# Patient Record
Sex: FEMALE | Race: WHITE | NOT HISPANIC OR LATINO | Employment: UNEMPLOYED | URBAN - METROPOLITAN AREA
[De-identification: names, ages, dates, MRNs, and addresses within clinical notes are randomized per-mention and may not be internally consistent; named-entity substitution may affect disease eponyms.]

---

## 2018-08-08 ENCOUNTER — OFFICE VISIT (OUTPATIENT)
Dept: MEDICAL GROUP | Facility: MEDICAL CENTER | Age: 58
End: 2018-08-08
Attending: NURSE PRACTITIONER
Payer: MEDICAID

## 2018-08-08 VITALS
WEIGHT: 241 LBS | RESPIRATION RATE: 22 BRPM | OXYGEN SATURATION: 92 % | DIASTOLIC BLOOD PRESSURE: 70 MMHG | HEIGHT: 65 IN | TEMPERATURE: 99.2 F | BODY MASS INDEX: 40.15 KG/M2 | HEART RATE: 100 BPM | SYSTOLIC BLOOD PRESSURE: 138 MMHG

## 2018-08-08 DIAGNOSIS — J44.9 CHRONIC OBSTRUCTIVE PULMONARY DISEASE, UNSPECIFIED COPD TYPE (HCC): ICD-10-CM

## 2018-08-08 DIAGNOSIS — Z72.0 TOBACCO ABUSE: ICD-10-CM

## 2018-08-08 DIAGNOSIS — Z13.29 SCREENING FOR THYROID DISORDER: ICD-10-CM

## 2018-08-08 DIAGNOSIS — Z13.1 SCREENING FOR DIABETES MELLITUS: ICD-10-CM

## 2018-08-08 DIAGNOSIS — N18.30 STAGE 3 CHRONIC KIDNEY DISEASE (HCC): ICD-10-CM

## 2018-08-08 DIAGNOSIS — Z13.0 SCREENING FOR IRON DEFICIENCY ANEMIA: ICD-10-CM

## 2018-08-08 DIAGNOSIS — M79.7 FIBROMYALGIA: ICD-10-CM

## 2018-08-08 DIAGNOSIS — Z13.21 ENCOUNTER FOR VITAMIN DEFICIENCY SCREENING: ICD-10-CM

## 2018-08-08 DIAGNOSIS — Z85.42 HISTORY OF UTERINE CANCER: ICD-10-CM

## 2018-08-08 DIAGNOSIS — I25.10 CAD, MULTIPLE VESSEL: ICD-10-CM

## 2018-08-08 DIAGNOSIS — Z12.11 SCREEN FOR COLON CANCER: ICD-10-CM

## 2018-08-08 DIAGNOSIS — Z00.00 ROUTINE HEALTH MAINTENANCE: ICD-10-CM

## 2018-08-08 DIAGNOSIS — G47.39 OTHER SLEEP APNEA: ICD-10-CM

## 2018-08-08 DIAGNOSIS — I10 ESSENTIAL HYPERTENSION: ICD-10-CM

## 2018-08-08 DIAGNOSIS — G89.4 CHRONIC PAIN SYNDROME: ICD-10-CM

## 2018-08-08 DIAGNOSIS — K21.00 GASTROESOPHAGEAL REFLUX DISEASE WITH ESOPHAGITIS: ICD-10-CM

## 2018-08-08 DIAGNOSIS — Z12.39 SCREENING FOR BREAST CANCER: ICD-10-CM

## 2018-08-08 DIAGNOSIS — D12.6 ADENOMATOUS POLYP OF COLON, UNSPECIFIED PART OF COLON: ICD-10-CM

## 2018-08-08 DIAGNOSIS — Z13.220 SCREENING FOR CHOLESTEROL LEVEL: ICD-10-CM

## 2018-08-08 DIAGNOSIS — G40.909 SEIZURE DISORDER (HCC): ICD-10-CM

## 2018-08-08 DIAGNOSIS — E78.2 MIXED HYPERLIPIDEMIA: ICD-10-CM

## 2018-08-08 DIAGNOSIS — E66.01 CLASS 3 SEVERE OBESITY DUE TO EXCESS CALORIES WITH SERIOUS COMORBIDITY AND BODY MASS INDEX (BMI) OF 40.0 TO 44.9 IN ADULT (HCC): ICD-10-CM

## 2018-08-08 DIAGNOSIS — R23.2 HOT FLASHES: ICD-10-CM

## 2018-08-08 DIAGNOSIS — K59.09 OTHER CONSTIPATION: ICD-10-CM

## 2018-08-08 DIAGNOSIS — F99 PSYCHIATRIC DISORDER: ICD-10-CM

## 2018-08-08 PROBLEM — K63.5 COLON POLYPS: Status: ACTIVE | Noted: 2018-08-08

## 2018-08-08 PROCEDURE — 99204 OFFICE O/P NEW MOD 45 MIN: CPT | Performed by: NURSE PRACTITIONER

## 2018-08-08 RX ORDER — METHOCARBAMOL 500 MG/1
1000 TABLET, FILM COATED ORAL 3 TIMES DAILY
COMMUNITY
End: 2018-08-08 | Stop reason: SDUPTHER

## 2018-08-08 RX ORDER — BUSPIRONE HYDROCHLORIDE 15 MG/1
7.5 TABLET ORAL 3 TIMES DAILY
COMMUNITY
End: 2018-08-08 | Stop reason: SDUPTHER

## 2018-08-08 RX ORDER — SYRINGE-NEEDLE,INSULIN,0.5 ML 28GX1/2"
SYRINGE, EMPTY DISPOSABLE MISCELLANEOUS
Qty: 200 EACH | Refills: 1 | Status: SHIPPED | OUTPATIENT
Start: 2018-08-08 | End: 2019-01-07

## 2018-08-08 RX ORDER — ASPIRIN AND DIPYRIDAMOLE 25; 200 MG/1; MG/1
1 CAPSULE, EXTENDED RELEASE ORAL 2 TIMES DAILY
COMMUNITY
End: 2018-08-08 | Stop reason: SDUPTHER

## 2018-08-08 RX ORDER — GEMFIBROZIL 600 MG/1
600 TABLET, FILM COATED ORAL 2 TIMES DAILY
Qty: 60 TAB | Refills: 1 | Status: SHIPPED | OUTPATIENT
Start: 2018-08-08 | End: 2018-10-01 | Stop reason: SDUPTHER

## 2018-08-08 RX ORDER — INSULIN GLARGINE 100 [IU]/ML
80 INJECTION, SOLUTION SUBCUTANEOUS NIGHTLY
COMMUNITY
End: 2018-08-08 | Stop reason: SDUPTHER

## 2018-08-08 RX ORDER — ALBUTEROL SULFATE 90 UG/1
2 AEROSOL, METERED RESPIRATORY (INHALATION) EVERY 6 HOURS PRN
Qty: 8.5 G | Refills: 2 | Status: SHIPPED | OUTPATIENT
Start: 2018-08-08 | End: 2019-01-07

## 2018-08-08 RX ORDER — ATORVASTATIN CALCIUM 40 MG/1
40 TABLET, FILM COATED ORAL DAILY
Qty: 30 TAB | Refills: 1 | Status: SHIPPED | OUTPATIENT
Start: 2018-08-08 | End: 2019-01-07

## 2018-08-08 RX ORDER — TOPIRAMATE 50 MG/1
50 TABLET, FILM COATED ORAL 2 TIMES DAILY
Qty: 60 TAB | Refills: 1 | Status: SHIPPED | OUTPATIENT
Start: 2018-08-08 | End: 2018-10-01 | Stop reason: SDUPTHER

## 2018-08-08 RX ORDER — QUETIAPINE FUMARATE 400 MG/1
800 TABLET, FILM COATED ORAL
COMMUNITY
End: 2018-08-08 | Stop reason: SDUPTHER

## 2018-08-08 RX ORDER — PREGABALIN 200 MG/1
200 CAPSULE ORAL 2 TIMES DAILY
Qty: 60 CAP | Refills: 1 | Status: SHIPPED | OUTPATIENT
Start: 2018-08-08 | End: 2018-09-07

## 2018-08-08 RX ORDER — METHOCARBAMOL 500 MG/1
1000 TABLET, FILM COATED ORAL 3 TIMES DAILY
Qty: 180 TAB | Refills: 1 | Status: SHIPPED | OUTPATIENT
Start: 2018-08-08 | End: 2018-10-10 | Stop reason: RX

## 2018-08-08 RX ORDER — LEVETIRACETAM 1000 MG/1
1500 TABLET ORAL 2 TIMES DAILY
COMMUNITY
End: 2018-08-08 | Stop reason: SDUPTHER

## 2018-08-08 RX ORDER — POTASSIUM CHLORIDE 750 MG/1
10 TABLET, EXTENDED RELEASE ORAL 2 TIMES DAILY
COMMUNITY
End: 2018-08-08 | Stop reason: SDUPTHER

## 2018-08-08 RX ORDER — NITROGLYCERIN 40 MG/1
1 PATCH TRANSDERMAL DAILY
COMMUNITY
End: 2019-01-07

## 2018-08-08 RX ORDER — TOPIRAMATE 50 MG/1
50 TABLET, FILM COATED ORAL 2 TIMES DAILY
COMMUNITY
End: 2018-08-08 | Stop reason: SDUPTHER

## 2018-08-08 RX ORDER — POTASSIUM CHLORIDE 750 MG/1
10 TABLET, EXTENDED RELEASE ORAL 2 TIMES DAILY
Qty: 60 TAB | Refills: 1 | Status: SHIPPED | OUTPATIENT
Start: 2018-08-08 | End: 2018-10-01 | Stop reason: SDUPTHER

## 2018-08-08 RX ORDER — MIRTAZAPINE 45 MG/1
45 TABLET, FILM COATED ORAL
Qty: 30 TAB | Refills: 1 | Status: SHIPPED | OUTPATIENT
Start: 2018-08-08 | End: 2018-10-01 | Stop reason: SDUPTHER

## 2018-08-08 RX ORDER — FUROSEMIDE 40 MG/1
40 TABLET ORAL DAILY
COMMUNITY
End: 2018-08-08 | Stop reason: SDUPTHER

## 2018-08-08 RX ORDER — POLYETHYLENE GLYCOL 3350 17 G/17G
17 POWDER, FOR SOLUTION ORAL DAILY
Qty: 1 EACH | Refills: 1 | Status: SHIPPED | OUTPATIENT
Start: 2018-08-08 | End: 2019-01-07

## 2018-08-08 RX ORDER — RANITIDINE 300 MG/1
300 TABLET ORAL 2 TIMES DAILY
Qty: 60 TAB | Refills: 1 | Status: SHIPPED | OUTPATIENT
Start: 2018-08-08 | End: 2018-10-01 | Stop reason: SDUPTHER

## 2018-08-08 RX ORDER — DEXLANSOPRAZOLE 60 MG/1
60 CAPSULE, DELAYED RELEASE ORAL DAILY
COMMUNITY
End: 2018-08-08 | Stop reason: SDUPTHER

## 2018-08-08 RX ORDER — POLYETHYLENE GLYCOL 3350 17 G/17G
17 POWDER, FOR SOLUTION ORAL DAILY
COMMUNITY
End: 2018-08-08 | Stop reason: SDUPTHER

## 2018-08-08 RX ORDER — SYRINGE-NEEDLE,INSULIN,0.5 ML 28GX1/2"
SYRINGE, EMPTY DISPOSABLE MISCELLANEOUS
COMMUNITY
End: 2018-08-08 | Stop reason: SDUPTHER

## 2018-08-08 RX ORDER — FUROSEMIDE 40 MG/1
40 TABLET ORAL DAILY
Qty: 30 TAB | Refills: 1 | Status: SHIPPED | OUTPATIENT
Start: 2018-08-08 | End: 2018-09-17 | Stop reason: SDUPTHER

## 2018-08-08 RX ORDER — ASPIRIN AND DIPYRIDAMOLE 25; 200 MG/1; MG/1
1 CAPSULE, EXTENDED RELEASE ORAL 2 TIMES DAILY
Qty: 60 CAP | Refills: 1 | Status: SHIPPED | OUTPATIENT
Start: 2018-08-08 | End: 2018-10-09 | Stop reason: SDUPTHER

## 2018-08-08 RX ORDER — ATORVASTATIN CALCIUM 40 MG/1
40 TABLET, FILM COATED ORAL NIGHTLY
COMMUNITY
End: 2018-08-08 | Stop reason: SDUPTHER

## 2018-08-08 RX ORDER — DEXLANSOPRAZOLE 60 MG/1
60 CAPSULE, DELAYED RELEASE ORAL DAILY
Qty: 30 CAP | Refills: 2 | Status: SHIPPED | OUTPATIENT
Start: 2018-08-08 | End: 2019-01-07

## 2018-08-08 RX ORDER — RANITIDINE 300 MG/1
300 TABLET ORAL 2 TIMES DAILY
COMMUNITY
End: 2018-08-08 | Stop reason: SDUPTHER

## 2018-08-08 RX ORDER — LEVETIRACETAM 1000 MG/1
1500 TABLET ORAL 2 TIMES DAILY
Qty: 90 TAB | Refills: 1 | Status: SHIPPED | OUTPATIENT
Start: 2018-08-08 | End: 2018-10-01 | Stop reason: SDUPTHER

## 2018-08-08 RX ORDER — GEMFIBROZIL 600 MG/1
600 TABLET, FILM COATED ORAL 2 TIMES DAILY
COMMUNITY
End: 2018-08-08 | Stop reason: SDUPTHER

## 2018-08-08 RX ORDER — MIRTAZAPINE 45 MG/1
45 TABLET, FILM COATED ORAL NIGHTLY
COMMUNITY
End: 2018-08-08 | Stop reason: SDUPTHER

## 2018-08-08 RX ORDER — QUETIAPINE FUMARATE 400 MG/1
800 TABLET, FILM COATED ORAL
Qty: 60 TAB | Refills: 1 | Status: SHIPPED | OUTPATIENT
Start: 2018-08-08 | End: 2019-04-01 | Stop reason: SDUPTHER

## 2018-08-08 RX ORDER — ALBUTEROL SULFATE 90 UG/1
2 AEROSOL, METERED RESPIRATORY (INHALATION) EVERY 6 HOURS PRN
COMMUNITY
End: 2018-08-08 | Stop reason: SDUPTHER

## 2018-08-08 RX ORDER — BUSPIRONE HYDROCHLORIDE 15 MG/1
7.5 TABLET ORAL 3 TIMES DAILY
Qty: 90 TAB | Refills: 1 | Status: SHIPPED | OUTPATIENT
Start: 2018-08-08 | End: 2018-09-11 | Stop reason: SDUPTHER

## 2018-08-08 RX ORDER — PREGABALIN 200 MG/1
200 CAPSULE ORAL 2 TIMES DAILY
COMMUNITY
End: 2018-08-08 | Stop reason: SDUPTHER

## 2018-08-08 RX ORDER — INSULIN GLARGINE 100 [IU]/ML
80 INJECTION, SOLUTION SUBCUTANEOUS NIGHTLY PRN
Qty: 20 ML | Refills: 1 | Status: SHIPPED | OUTPATIENT
Start: 2018-08-08 | End: 2018-09-12 | Stop reason: SDUPTHER

## 2018-08-08 ASSESSMENT — PATIENT HEALTH QUESTIONNAIRE - PHQ9
CLINICAL INTERPRETATION OF PHQ2 SCORE: 6
SUM OF ALL RESPONSES TO PHQ QUESTIONS 1-9: 22
5. POOR APPETITE OR OVEREATING: 3 - NEARLY EVERY DAY

## 2018-08-08 NOTE — ASSESSMENT & PLAN NOTE
"Reports chronic pain, \"all over\"  And hx of \"Fibromyalgia\"  Taking Lyrica and needs refill  Also asking for narcotic but denied by me  Asking for Pain Management Referral.    "

## 2018-08-08 NOTE — ASSESSMENT & PLAN NOTE
"Has bad hot flashes 'bad\" and was on estrogen pills in past and because of age and smoking she was stopped.   Will refer to GYN.  "

## 2018-08-08 NOTE — PROGRESS NOTES
"Chief Complaint:   Chief Complaint   Patient presents with   • New Patient   • Colon Polyps   • Hiatal Hernia   • Diabetes       HPI:  Norma is here today to establish as a new patient with multiple concerns  And multiple medication refills and referral requests.  .    Nev  Report:   No entries    Her PMH includes:  Anxiety and Depression  Bipolar Disorder  Fibromyalgia  Seizures  HTN  MAGNOLIA  CAD  DM  Morbid Obesity  \"fibromyalgia\"  Chronic pain  Colon Polyps  Hiatal Hernia  Short term memory concerns  Kidney Disease  Tobacco abuse- smoking  Constipation  GERD  Cervical/Uterine Cancer w hysterectomy         Colon polyps  Hx of Colon Polyps found 2-3 yrs ago.  Polypectomy every year the past few years  Needs fit test and referral to GI    Denies bloody stools.  Has constipation w hard stools  Uses laxative.    Mixed hyperlipidemia  Hx of hi triglycerides and cholesterol   On meds from [place]  Will check lipid panel    Psychiatric disorder  Hx of Anxiety, Depression, Bipolar Disorder  On meds  REcent move from [place]  Will refer to Psychiatry and Psychology    Uncontrolled type 2 diabetes mellitus without complication, without long-term current use of insulin (HCC)  DM-2 on Insulin, obese  > 15 yrs DM-2  5 yrs on Insulin  Last A1c= 9.2 about 3-4 months ago.     Routine health maintenance  LMP 20 yrs.  Hysterectomy 2' \"maybe Cervical cancer.  Oophrectomy.  appendectomy    Hot flashes/ Hx of Uterine Ca and Hysterectomy  Has bad hot flashes 'bad\" and was on estrogen pills in past and because of age and smoking she was stopped.   Hx of Cervical or Uterine ca and surgery 20 yrs ago. Has not had f/u w GYN  Will refer to GYN.    Other sleep apnea/COPD  Sleep apnea on CPAP 10-15 yrs.  \"old machine\"   Needs new study  Hx of heavy smoking for years  Reports has COPD and needs med refills    Stage 3 chronic kidney disease  REports hx of CKD-3  Previous w Nephrologist in [place]  Needs CMP and referral.    CAD, multiple " "vessel  Hx of CAD, hyperlipidemia  Has had PCTA.  Needs cardiology eval and Med Refills.  Denies current CP or SOB    Chronic Pain/ \"fibromyalgia\"  Reports chronic pain, \"all over\"  And hx of \"Fibromyalgia\"  Taking Lyrica and needs refill  Also asking for narcotic but denied by me  Asking for Pain Management Referral.    Patient Active Problem List    Diagnosis Date Noted   • Routine health maintenance 08/08/2018   • Uncontrolled type 2 diabetes mellitus without complication, without long-term current use of insulin (HCC) 08/08/2018   • Colon polyps 08/08/2018   • Mixed hyperlipidemia 08/08/2018   • Tobacco abuse 08/08/2018   • Psychiatric disorder 08/08/2018   • Hot flashes 08/08/2018   • Other sleep apnea 08/08/2018   • Stage 3 chronic kidney disease 08/08/2018   • CAD, multiple vessel 08/08/2018   • Chronic pain syndrome 08/08/2018       Allergies:Patient has no allergy information on record.    Current Outpatient Prescriptions   Medication Sig Dispense Refill   • Cranberry 1000 MG Cap Take 1,000 mg by mouth every day.     • nitroglycerin (NITRODUR) 0.2 MG/HR PATCH 24 HR Apply 1 Patch to skin as directed every day.     • Dexlansoprazole (DEXILANT) 60 MG CAPSULE DELAYED RELEASE delayed-release capsule Take 60 mg by mouth every day. 30 Cap 2   • Plecanatide (TRULANCE) 3 MG Tab Take 3 mg by mouth every day. 30 Tab 1   • aspirin-dipyridamole (AGGRENOX)  MG CAPSULE SR 12 HR Take 1 Cap by mouth 2 times a day. 60 Cap 1   • topiramate (TOPAMAX) 50 MG tablet Take 1 Tab by mouth 2 times a day. 60 Tab 1   • metoprolol (LOPRESSOR) 25 MG Tab Take 1 Tab by mouth every day. 30 Tab 2   • polyethylene glycol/lytes (MIRALAX) Pack Take 1 Packet by mouth every day. 1 Each 1   • methocarbamol (ROBAXIN) 500 MG Tab Take 2 Tabs by mouth 3 times a day. 180 Tab 1   • pregabalin (LYRICA) 200 MG capsule Take 1 Cap by mouth 2 times a day for 30 days. 60 Cap 1   • mirtazapine (REMERON) 45 MG tablet Take 1 Tab by mouth 1 time daily as " "needed. 30 Tab 1   • Levomilnacipran HCl ER (FETZIMA) 120 MG CAPSULE SR 24 HR Take 120 mg by mouth every morning. 30 Cap 2   • quetiapine (SEROQUEL) 400 MG tablet Take 2 Tabs by mouth every bedtime. 60 Tab 1   • busPIRone (BUSPAR) 15 MG tablet Take 0.5 Tabs by mouth 3 times a day. 90 Tab 1   • gemfibrozil (LOPID) 600 MG Tab Take 1 Tab by mouth 2 times a day. 60 Tab 1   • atorvastatin (LIPITOR) 40 MG Tab Take 1 Tab by mouth every day. 30 Tab 1   • furosemide (LASIX) 40 MG Tab Take 1 Tab by mouth every day. 30 Tab 1   • metFORMIN (GLUCOPHAGE) 500 MG Tab Take 1 Tab by mouth 2 times a day, with meals. 60 Tab 1   • levetiracetam (KEPPRA) 1000 MG tablet Take 1.5 Tabs by mouth 2 Times a Day. 90 Tab 1   • ranitidine (ZANTAC) 300 MG tablet Take 1 Tab by mouth 2 Times a Day. 60 Tab 1   • insulin lispro (HUMALOG) 100 UNIT/ML Solution Inject 40 Units as instructed 3 times a day before meals. 20 mL 1   • insulin glargine (LANTUS) 100 UNIT/ML Solution Inject 80 Units as instructed at bedtime as needed. 20 mL 1   • Insulin Syringe-Needle U-100 (INSULIN SYRINGE .5CC/30GX1/2\") 30G X 1/2\" 0.5 ML Misc Two types of insulin, inject 4x day 200 Each 1   • albuterol (PROVENTIL HFA) 108 (90 Base) MCG/ACT Aero Soln inhalation aerosol Inhale 2 Puffs by mouth every 6 hours as needed for Shortness of Breath. 8.5 g 2   • potassium chloride SA (K-DUR) 10 MEQ Tab CR Take 1 Tab by mouth 2 times a day. 60 Tab 1     No current facility-administered medications for this visit.        Social History   Substance Use Topics   • Smoking status: Current Every Day Smoker     Packs/day: 2.00     Years: 37.00     Types: Cigarettes   • Smokeless tobacco: Never Used   • Alcohol use No       Past Medical History:   Diagnosis Date   • Anxiety    • Bipolar affective disorder (HCC)    • Depression    • Diabetes (HCC)    • Kidney disease        Family History   Problem Relation Age of Onset   • Heart Disease Mother    • Diabetes Father    • Cancer Maternal " "Grandfather        ROS:  Review of Systems   See HPI Above    Exam:  Blood pressure 138/70, pulse 100, temperature 37.3 °C (99.2 °F), resp. rate (!) 22, height 1.651 m (5' 5\"), weight 109.3 kg (241 lb), SpO2 92 %. Body mass index is 40.1 kg/m².    General:  Well nourished, obese,well developed female in moderate discomfort  HENT:Head is grossly normal. PERRL.  Neck: Supple. Trachea is midline.  Pulmonary: Clear but diminished throughout to ausculation .  Normal effort. No rales, ronchi, or wheezing.   Cardiovascular: Regular rate and rhythm.  Abdomen-Abdomen is soft, No tenderness.  Upper extremities- Strong = . Good ROM  Lower extremities- neg for edema, redness, tenderness.  Neuro- A & O x 4. Speech clear and appropriate.    Current medications, allergies, and problem list reviewed with patient and updated in [facility] today.    Assessment/Plan:  1. Screening for iron deficiency anemia  CBC WITH DIFFERENTIAL    IRON/TOTAL IRON BIND   2. Screening for thyroid disorder  TSH   3. Screening for diabetes mellitus  HEMOGLOBIN A1C    COMP METABOLIC PANEL   4. Encounter for vitamin deficiency screening  VITAMIN B12    VITAMIN D,25 HYDROXY   5. Screening for cholesterol level  LIPID PROFILE   6. Screen for colon cancer  OCCULT BLOOD FECES IMMUNOASSAY    OCCULT BLOOD FECES IMMUNOASSAY (FIT)   7. Routine health maintenance  Refer GYN  , will need REtina exam this year.   8. Screening for breast cancer  MA-SCREEN MAMMO W/CAD-BILAT   9. Uncontrolled type 2 diabetes mellitus without complication, without long-term current use of insulin (HCC)  metFORMIN (GLUCOPHAGE) 500 MG Tab    insulin lispro (HUMALOG) 100 UNIT/ML Solution    insulin glargine (LANTUS) 100 UNIT/ML Solution    Insulin Syringe-Needle U-100 (INSULIN SYRINGE .5CC/30GX1/2\") 30G X 1/2\" 0.5 ML Misc   10. Adenomatous polyp of colon, unspecified part of colon  REFERRAL TO GI FOR COLONOSCOPY   11. Class 3 severe obesity due to excess calories with serious comorbidity " and body mass index (BMI) of 40.0 to 44.9 in adult ([facility])  Patient identified as having weight management issue.  Appropriate orders and counseling given.   12. Mixed hyperlipidemia  gemfibrozil (LOPID) 600 MG Tab    atorvastatin (LIPITOR) 40 MG Tab   13. Tobacco abuse  Counseled but not ready to quit   14. Psychiatric disorder  REFERRAL TO PSYCHIATRY    REFERRAL TO PSYCHOLOGY    mirtazapine (REMERON) 45 MG tablet    Levomilnacipran HCl ER (FETZIMA) 120 MG CAPSULE SR 24 HR    quetiapine (SEROQUEL) 400 MG tablet    busPIRone (BUSPAR) 15 MG tablet   15. History of uterine cancer  REFERRAL TO GYNECOLOGY   16. Hot flashes  GYN   17. Other sleep apnea  REFERRAL TO SLEEP STUDIES  Continue current CPAP   18. Stage 3 chronic kidney disease  REFERRAL TO NEPHROLOGY   19. CAD, multiple vessel  REFERRAL TO CARDIOLOGY    aspirin-dipyridamole (AGGRENOX)  MG CAPSULE SR 12 HR   20. Fibromyalgia / Chronic pain syndrome  topiramate (TOPAMAX) 50 MG tablet    methocarbamol (ROBAXIN) 500 MG Tab    pregabalin (LYRICA) 200 MG capsule  REFERRAL TO PAIN CLINIC   21. Gastroesophageal reflux disease with esophagitis  Dexlansoprazole (DEXILANT) 60 MG CAPSULE DELAYED RELEASE delayed-release capsule    ranitidine (ZANTAC) 300 MG tablet   22. Chronic obstructive pulmonary disease, unspecified COPD type ([facility])  albuterol (PROVENTIL HFA) 108 (90 Base) MCG/ACT Aero Soln inhalation aerosol   23. Other constipation  Plecanatide (TRULANCE) 3 MG Tab    polyethylene glycol/lytes (MIRALAX) Pack   24. Essential hypertension  metoprolol (LOPRESSOR) 25 MG Tab    furosemide (LASIX) 40 MG Tab    potassium chloride SA (K-DUR) 10 MEQ Tab CR   25. Seizure disorder ([facility])  levetiracetam (KEPPRA) 1000 MG tablet            Return in about 4 weeks (around 9/5/2018) for lab results.

## 2018-08-08 NOTE — ASSESSMENT & PLAN NOTE
Hx of Colon Polyps found 2-3 yrs ago.  Polypectomy every year the past few years  Needs fit test and referral to GI    Denies bloody stools.  Has constipation w hard stools  Uses laxative.

## 2018-08-08 NOTE — ASSESSMENT & PLAN NOTE
Hx of Anxiety, Depression, Bipolar Disorder  On meds  REcent move from [place]  Will refer to Psychiatry and Psychology

## 2018-08-10 ENCOUNTER — HOSPITAL ENCOUNTER (OUTPATIENT)
Facility: MEDICAL CENTER | Age: 58
End: 2018-08-10
Attending: NURSE PRACTITIONER
Payer: MEDICAID

## 2018-08-10 PROCEDURE — 82274 ASSAY TEST FOR BLOOD FECAL: CPT

## 2018-08-15 DIAGNOSIS — Z12.11 SCREEN FOR COLON CANCER: ICD-10-CM

## 2018-08-16 LAB — HEMOCCULT STL QL IA: NEGATIVE

## 2018-08-21 ENCOUNTER — TELEPHONE (OUTPATIENT)
Dept: MEDICAL GROUP | Facility: MEDICAL CENTER | Age: 58
End: 2018-08-21

## 2018-08-21 DIAGNOSIS — F99 PSYCHIATRIC DISORDER: ICD-10-CM

## 2018-08-21 DIAGNOSIS — G89.4 CHRONIC PAIN SYNDROME: ICD-10-CM

## 2018-08-21 RX ORDER — VENLAFAXINE HYDROCHLORIDE 75 MG/1
75 CAPSULE, EXTENDED RELEASE ORAL DAILY
Qty: 30 CAP | Refills: 1 | Status: SHIPPED | OUTPATIENT
Start: 2018-08-21 | End: 2018-10-13 | Stop reason: SDUPTHER

## 2018-08-21 NOTE — TELEPHONE ENCOUNTER
Called pt to discuss below information. Pt states she saw a therapist but is still waiting to be seen by a psychiatrist. She states that is fine she will try the venlafaxine while she is waiting.    Pt states she was not able to  her lyrica that day either because it required prior authorization. Advised I will call pharmacy to see which medications were picked up and which ones were not and if a prior auth is needed I will start one today.  Pt states she did not receive her insulin needles either.    I called pharmacy, they gave me a break down of all meds picked up. I was advised Lyrica was never called in nor dropped off. Advised I show it was printed on paper rx and will call it in. Was advised needles were out of stock and they will fill them today along with lyrica and the new medication when e-scripted over.

## 2018-08-21 NOTE — TELEPHONE ENCOUNTER
Please call Norma and let her know her Insurance denied the Fetzima ( SNRI Antidepressant)  And we can send in an alternative SNRI ( Venlafaxine) while she waits to establish w Psychiatry  As she should not stop an SNRI medication abruptly.  Please let me know if she has seen [facility] yet or needs me to send in RX for   Venlafaxine.  Thanks  Casey

## 2018-08-28 ENCOUNTER — HOSPITAL ENCOUNTER (OUTPATIENT)
Dept: RADIOLOGY | Facility: MEDICAL CENTER | Age: 58
End: 2018-08-28
Attending: NURSE PRACTITIONER
Payer: MEDICAID

## 2018-08-28 DIAGNOSIS — Z12.31 ENCOUNTER FOR SCREENING MAMMOGRAM FOR MALIGNANT NEOPLASM OF BREAST: ICD-10-CM

## 2018-08-28 PROCEDURE — 77067 SCR MAMMO BI INCL CAD: CPT

## 2018-09-04 ENCOUNTER — HOSPITAL ENCOUNTER (OUTPATIENT)
Dept: RADIOLOGY | Facility: MEDICAL CENTER | Age: 58
End: 2018-09-04

## 2018-09-11 ENCOUNTER — OFFICE VISIT (OUTPATIENT)
Dept: MEDICAL GROUP | Facility: MEDICAL CENTER | Age: 58
End: 2018-09-11
Attending: NURSE PRACTITIONER
Payer: MEDICAID

## 2018-09-11 VITALS
TEMPERATURE: 98.2 F | RESPIRATION RATE: 20 BRPM | WEIGHT: 234 LBS | HEART RATE: 92 BPM | HEIGHT: 65 IN | DIASTOLIC BLOOD PRESSURE: 60 MMHG | SYSTOLIC BLOOD PRESSURE: 104 MMHG | BODY MASS INDEX: 38.99 KG/M2 | OXYGEN SATURATION: 94 %

## 2018-09-11 DIAGNOSIS — H54.7 POOR VISION: ICD-10-CM

## 2018-09-11 DIAGNOSIS — M79.672 PAIN IN BOTH FEET: ICD-10-CM

## 2018-09-11 DIAGNOSIS — L08.9 SKIN INFECTION: ICD-10-CM

## 2018-09-11 DIAGNOSIS — F99 PSYCHIATRIC DISORDER: ICD-10-CM

## 2018-09-11 DIAGNOSIS — L08.9 STAPH SKIN INFECTION: ICD-10-CM

## 2018-09-11 DIAGNOSIS — E78.2 MIXED HYPERLIPIDEMIA: ICD-10-CM

## 2018-09-11 DIAGNOSIS — G89.4 CHRONIC PAIN SYNDROME: ICD-10-CM

## 2018-09-11 DIAGNOSIS — B95.8 STAPH SKIN INFECTION: ICD-10-CM

## 2018-09-11 DIAGNOSIS — N18.30 STAGE 3 CHRONIC KIDNEY DISEASE (HCC): ICD-10-CM

## 2018-09-11 DIAGNOSIS — M79.671 PAIN IN BOTH FEET: ICD-10-CM

## 2018-09-11 PROCEDURE — 99214 OFFICE O/P EST MOD 30 MIN: CPT | Performed by: NURSE PRACTITIONER

## 2018-09-11 RX ORDER — HYDROXYZINE HYDROCHLORIDE 10 MG/1
10 TABLET, FILM COATED ORAL 2 TIMES DAILY PRN
Qty: 60 TAB | Refills: 0 | Status: SHIPPED | OUTPATIENT
Start: 2018-09-11 | End: 2018-10-10 | Stop reason: SDUPTHER

## 2018-09-11 RX ORDER — DOXYCYCLINE 100 MG/1
100 CAPSULE ORAL 2 TIMES DAILY
Qty: 14 CAP | Refills: 0 | Status: SHIPPED | OUTPATIENT
Start: 2018-09-11 | End: 2019-01-07

## 2018-09-11 RX ORDER — BUSPIRONE HYDROCHLORIDE 15 MG/1
7.5 TABLET ORAL 3 TIMES DAILY
Qty: 90 TAB | Refills: 0 | Status: SHIPPED | OUTPATIENT
Start: 2018-09-11 | End: 2019-01-07

## 2018-09-11 ASSESSMENT — PAIN SCALES - GENERAL: PAINLEVEL: 6=MODERATE PAIN

## 2018-09-11 NOTE — ASSESSMENT & PLAN NOTE
Has poor vision and wears Bifocals ~ 7 yrs old.  Will refer to Optometry for exam([place] [facility])  And needs Ophthalmologist for retina exam and will refer.  Hx of Lens implants.

## 2018-09-11 NOTE — PROGRESS NOTES
"Chief Complaint:   Chief Complaint   Patient presents with   • Referral Needed     optometry    • Foot Problem       HPI:  Norma is here today for a follow-up on Results and multiple referrals.    Gab  Report:   No entries     Her PMH includes:  Anxiety and Depression  Bipolar Disorder  Fibromyalgia  Seizures  HTN  MAGNOLIA  CAD  DM  Morbid Obesity  \"fibromyalgia\"  Chronic pain  Colon Polyps  Hiatal Hernia  Hyperlipidemia  Short term memory concerns  Kidney Disease  Tobacco abuse- smoking  Constipation  GERD  Cervical/Uterine Cancer w hysterectomy     Referrals Approved:  Cardiology, Nephrology, GYN, Sleep Study, Psychiatry, Psychology, GI    Gab  Report:  8/30/18 Percocet 5/325 # 28 by Aung Degroot ( Pain Clinic)  8/22/18 Lyrica 200 mg # 60 by me  -----------------------------------------------------------------------------    Review of Records:  NO BLOOD WORK COMPLETED BY PATIENT as of today 9/11/18  Upcoming appts\"  12/3/18 Pulmonary for Sleep Study  10/4/18 Nephrology r/t CKD  9/25 Psychiatry- Dr Khan  9/20 GYN  9/20/18 Cardiology r/t CAD, HTN.  8/29/18 [place] [facility] -1st appt  8/28/18 Mammogram normal  8/10/18 Fit Stool Test= Negative  8/8/18 Clinic for New Patient appt, Multiple concerns/issues. ---> Labs ordered. Fit Test Ordered,  ---->  At new patient Clinic Visit - Multiple Referrals placed:  GYN  Psychiatry  Psychology  Nephrology  Cardiology  Pain Clinic  Sleep STudy.  GI for hx of colon polyps      Uncontrolled type 2 diabetes mellitus without complication, without long-term current use of insulin (HCC)  Pt last A1c reportedly 9.2 at least 4 months ago.  Pt encouraged to complete the past due blood work tests which includes  A1C.  States had to schedule lab draw in am as can't fast that long  Has scheduled for 9/19/18.  Reports continuing humalog Insulin, Is taking s/s 3-4 x per day.  Takes 80 units of Lantus at night.     States never controlled.  Discussed referral to " "Endocrine.    Mixed hyperlipidemia  Pt has hx of hyperlipidemia.  Has not completed labs which included lipid panel.  Does have appt w Cardiology set for 9/20/18  I have asked her to complete the labs prior to that appt.    Stage 3 chronic kidney disease  Has known CKD,   Recommend she complete past due blood work prior to appt  w Nephrology - DR Ortiz on 10/4/18.    Chronic pain syndrome  Chronic pain, on Lyrica.  Reports saw pain clinic and obtaining   RX for Percocet from Pain clinic.  See Nev .    Has f/u appt w Pain Clinic this week.    Poor vision  Has poor vision and wears Bifocals ~ 7 yrs old.  Will refer to Optometry for exam([facility])  And needs Ophthalmologist for retina exam and will refer.  Hx of Lens implants.    Pain in both feet  REports \"killing me\", \"the tops, the bottoms\" , \"some from fibromyalgia\"  Taking Lyrica and not helping feet pain.  Pain is \"stabbing\" , \"sometime shooting-stabbing\" and \"burning\".  In past has 'shots\" in her feet for pain control.    REcommend she talk to pain management.    Skin on feet is \"good\". Sometimes heel form 'deep blisters\" and recently got new shoes that fit better.   Has special inserts made by previous foot doctor. Recommend she re-start using them.    Psychiatric disorder  Pt reports has appt w Psychiatry 9/25/18 and has hx of Anxiety and depression, bipolar.   Denies suicidal ideation. Currently on Seroquel, Remeron, Effexor,     Skin infection  Reports hx of skin infections under arms and none now.  Hydradenitis suppurative.  Reports now 'small pimples\" to bottom 'not bad yet\"  Asking for antibiotic oral. Will also trial Bactroban.      Patient Active Problem List    Diagnosis Date Noted   • Pain in both feet 09/11/2018   • Poor vision 09/11/2018   • Skin infection 09/11/2018   • Routine health maintenance 08/08/2018   • Uncontrolled type 2 diabetes mellitus without complication, without long-term current use of insulin ([facility]) 08/08/2018   • Colon " polyps 08/08/2018   • Mixed hyperlipidemia 08/08/2018   • Tobacco abuse 08/08/2018   • Psychiatric disorder 08/08/2018   • Hot flashes 08/08/2018   • Other sleep apnea 08/08/2018   • Stage 3 chronic kidney disease 08/08/2018   • CAD, multiple vessel 08/08/2018   • Chronic pain syndrome 08/08/2018       Allergies:Baclofen; Ees [erythromycin]; and Erythromycin [akne-mycin]    Medicines as of today:  Current Outpatient Prescriptions   Medication Sig Dispense Refill   • busPIRone (BUSPAR) 15 MG tablet Take 0.5 Tabs by mouth 3 times a day. 90 Tab 0   • hydrOXYzine HCl (ATARAX) 10 MG Tab Take 1 Tab by mouth 2 times a day as needed for Itching. 60 Tab 0   • doxycycline (MONODOX) 100 MG capsule Take 1 Cap by mouth 2 times a day. 14 Cap 0   • mupirocin (BACTROBAN) 2 % Ointment Apply to skin infection twice daily 1 Tube 2   • venlafaxine XR (EFFEXOR XR) 75 MG CAPSULE SR 24 HR Take 1 Cap by mouth every day. 30 Cap 1   • Cranberry 1000 MG Cap Take 1,000 mg by mouth every day.     • nitroglycerin (NITRODUR) 0.2 MG/HR PATCH 24 HR Apply 1 Patch to skin as directed every day.     • Dexlansoprazole (DEXILANT) 60 MG CAPSULE DELAYED RELEASE delayed-release capsule Take 60 mg by mouth every day. 30 Cap 2   • Plecanatide (TRULANCE) 3 MG Tab Take 3 mg by mouth every day. 30 Tab 1   • aspirin-dipyridamole (AGGRENOX)  MG CAPSULE SR 12 HR Take 1 Cap by mouth 2 times a day. 60 Cap 1   • topiramate (TOPAMAX) 50 MG tablet Take 1 Tab by mouth 2 times a day. 60 Tab 1   • metoprolol (LOPRESSOR) 25 MG Tab Take 1 Tab by mouth every day. 30 Tab 2   • polyethylene glycol/lytes (MIRALAX) Pack Take 1 Packet by mouth every day. 1 Each 1   • methocarbamol (ROBAXIN) 500 MG Tab Take 2 Tabs by mouth 3 times a day. 180 Tab 1   • mirtazapine (REMERON) 45 MG tablet Take 1 Tab by mouth 1 time daily as needed. 30 Tab 1   • quetiapine (SEROQUEL) 400 MG tablet Take 2 Tabs by mouth every bedtime. 60 Tab 1   • gemfibrozil (LOPID) 600 MG Tab Take 1 Tab by mouth  "2 times a day. 60 Tab 1   • atorvastatin (LIPITOR) 40 MG Tab Take 1 Tab by mouth every day. 30 Tab 1   • furosemide (LASIX) 40 MG Tab Take 1 Tab by mouth every day. 30 Tab 1   • metFORMIN (GLUCOPHAGE) 500 MG Tab Take 1 Tab by mouth 2 times a day, with meals. 60 Tab 1   • levetiracetam (KEPPRA) 1000 MG tablet Take 1.5 Tabs by mouth 2 Times a Day. 90 Tab 1   • ranitidine (ZANTAC) 300 MG tablet Take 1 Tab by mouth 2 Times a Day. 60 Tab 1   • insulin lispro (HUMALOG) 100 UNIT/ML Solution Inject 40 Units as instructed 3 times a day before meals. 20 mL 1   • insulin glargine (LANTUS) 100 UNIT/ML Solution Inject 80 Units as instructed at bedtime as needed. 20 mL 1   • Insulin Syringe-Needle U-100 (INSULIN SYRINGE .5CC/30GX1/2\") 30G X 1/2\" 0.5 ML Misc Two types of insulin, inject 4x day 200 Each 1   • albuterol (PROVENTIL HFA) 108 (90 Base) MCG/ACT Aero Soln inhalation aerosol Inhale 2 Puffs by mouth every 6 hours as needed for Shortness of Breath. 8.5 g 2   • potassium chloride SA (K-DUR) 10 MEQ Tab CR Take 1 Tab by mouth 2 times a day. 60 Tab 1     No current facility-administered medications for this visit.        Social History   Substance Use Topics   • Smoking status: Current Every Day Smoker     Packs/day: 2.00     Years: 37.00     Types: Cigarettes   • Smokeless tobacco: Never Used   • Alcohol use No       Past Medical History:   Diagnosis Date   • Anxiety    • Bipolar affective disorder (HCC)    • Depression    • Diabetes (HCC)    • Kidney disease        Family History   Problem Relation Age of Onset   • Heart Disease Mother    • Diabetes Father    • Cancer Maternal Grandfather        ROS:  Review of Systems   See HPI Above    Exam:  Blood pressure 104/60, pulse 92, temperature 36.8 °C (98.2 °F), resp. rate 20, height 1.651 m (5' 5\"), weight 106.1 kg (234 lb), SpO2 94 %. Body mass index is 38.94 kg/m².    General:  Well nourished, over-weight, well developed female in moderate discomfort  HENT:Head is grossly " normal. PERRL. Wearing Bifocals.   Neck: Supple. Trachea is midline.  Pulmonary: Clear to ausculation .  Normal effort.   Cardiovascular: Regular rate and rhythm.  Abdomen-Abdomen is soft  Upper extremities- . Good ROM  Lower extremities- neg for edema, redness. Mild tenderness to both feet.  Neuro- A & O x 4. Speech clear and appropriate.    Current medications, allergies, and problem list reviewed with patient and updated in [facility] today.    Assessment/Plan:  1. Uncontrolled type 2 diabetes mellitus without complication, without long-term current use of insulin ([facility])  REFERRAL TO OPHTHALMOLOGY  Continue both types of Insulin as discussed.  Cut back on sugar/carbs.    REFERRAL TO ENDOCRINOLOGY    REFERRAL TO PODIATRY   2. Mixed hyperlipidemia  Continue Statin. Keep appt w Cardiology   3. Stage 3 chronic kidney disease  Avoid NSAID, keep appt w Nephrology   4. Chronic pain syndrome  Continue w [facility]. Continue Lyrica.   5. Pain in both feet  REFERRAL TO PODIATRY   6. Poor vision  [facility] Optometry for vision check and glass update.   7. Psychiatric disorder  busPIRone (BUSPAR) 15 MG tablet  Continue Venlafaxine, Seroquel, Remeron.  See Psychiatry as planned this month    hydrOXYzine HCl (ATARAX) 10 MG Tab   8. Skin infection  doxycycline (MONODOX) 100 MG capsule   9. Staph skin infection  mupirocin (BACTROBAN) 2 % Ointment       No Follow-up on file.

## 2018-09-11 NOTE — ASSESSMENT & PLAN NOTE
"Reports hx of skin infections under arms and none now.  Hydradenitis suppurative.  Reports now 'small pimples\" to bottom 'not bad yet\"  Asking for antibiotic oral. Will also trial Bactroban.  "

## 2018-09-11 NOTE — ASSESSMENT & PLAN NOTE
"REports \"killing me\", \"the tops, the bottoms\" , \"some from fibromyalgia\"  Taking Lyrica and not helping feet pain.  Pain is \"stabbing\" , \"sometime shooting-stabbing\" and \"burning\".  In past has 'shots\" in her feet for pain control.    REcommend she talk to pain management.    Skin on feet is \"good\". Sometimes heel form 'deep blisters\" and recently got new shoes that fit better.   Has special inserts made by previous foot doctor. Recommend she re-start using them.  "

## 2018-09-11 NOTE — ASSESSMENT & PLAN NOTE
Has known CKD,   Recommend she complete past due blood work prior to appt  w Nephrology - DR Ortiz on 10/4/18.

## 2018-09-11 NOTE — ASSESSMENT & PLAN NOTE
Pt reports has appt w Psychiatry 9/25/18 and has hx of Anxiety and depression, bipolar.   Denies suicidal ideation. Currently on Seroquel, Remeron, Effexor,

## 2018-09-11 NOTE — ASSESSMENT & PLAN NOTE
Pt last A1c reportedly 9.2 at least 4 months ago.  Pt encouraged to complete the past due blood work tests which includes  A1C.  States had to schedule lab draw in am as can't fast that long  Has scheduled for 9/19/18.  Reports continuing humalog Insulin, Is taking s/s 3-4 x per day.  Takes 80 units of Lantus at night.     States never controlled.  Discussed referral to Endocrine.

## 2018-09-11 NOTE — ASSESSMENT & PLAN NOTE
Chronic pain, on Lyrica.  Reports saw pain clinic and obtaining   RX for Percocet from Pain clinic.  See Gab .    Has f/u appt w Pain Clinic this week.

## 2018-09-11 NOTE — ASSESSMENT & PLAN NOTE
Pt has hx of hyperlipidemia.  Has not completed labs which included lipid panel.  Does have appt w Cardiology set for 9/20/18  I have asked her to complete the labs prior to that appt.

## 2018-09-12 ENCOUNTER — TELEPHONE (OUTPATIENT)
Dept: CARDIOLOGY | Facility: MEDICAL CENTER | Age: 58
End: 2018-09-12

## 2018-09-12 RX ORDER — INSULIN GLARGINE 100 [IU]/ML
80 INJECTION, SOLUTION SUBCUTANEOUS NIGHTLY PRN
Qty: 60 ML | Refills: 0 | Status: SHIPPED | OUTPATIENT
Start: 2018-09-12 | End: 2018-12-09 | Stop reason: SDUPTHER

## 2018-09-17 DIAGNOSIS — I10 ESSENTIAL HYPERTENSION: ICD-10-CM

## 2018-09-17 RX ORDER — FUROSEMIDE 40 MG/1
40 TABLET ORAL DAILY
Qty: 45 TAB | Refills: 1 | Status: SHIPPED | OUTPATIENT
Start: 2018-09-17 | End: 2018-10-31 | Stop reason: SDUPTHER

## 2018-09-17 NOTE — TELEPHONE ENCOUNTER
Was the patient seen in the last year in this department? Yes    Does patient have an active prescription for medications requested? No     Received Request Via: Pharmacy     REQUESTING 90 DAY SUPPLY PER INSURANCE.

## 2018-09-21 ENCOUNTER — HOSPITAL ENCOUNTER (OUTPATIENT)
Dept: LAB | Facility: MEDICAL CENTER | Age: 58
End: 2018-09-21
Attending: SPECIALIST
Payer: MEDICAID

## 2018-09-21 PROCEDURE — 87591 N.GONORRHOEAE DNA AMP PROB: CPT

## 2018-09-21 PROCEDURE — 87491 CHLMYD TRACH DNA AMP PROBE: CPT

## 2018-09-21 PROCEDURE — 87624 HPV HI-RISK TYP POOLED RSLT: CPT

## 2018-09-21 PROCEDURE — 88175 CYTOPATH C/V AUTO FLUID REDO: CPT

## 2018-09-27 ENCOUNTER — OFFICE VISIT (OUTPATIENT)
Dept: ENDOCRINOLOGY | Facility: MEDICAL CENTER | Age: 58
End: 2018-09-27
Payer: MEDICAID

## 2018-09-27 DIAGNOSIS — N18.30 STAGE 3 CHRONIC KIDNEY DISEASE (HCC): ICD-10-CM

## 2018-09-27 DIAGNOSIS — G62.9 NEUROPATHY: ICD-10-CM

## 2018-09-27 DIAGNOSIS — E78.2 MIXED HYPERLIPIDEMIA: ICD-10-CM

## 2018-09-27 DIAGNOSIS — R53.83 FATIGUE, UNSPECIFIED TYPE: ICD-10-CM

## 2018-09-27 PROCEDURE — 99204 OFFICE O/P NEW MOD 45 MIN: CPT | Performed by: PHYSICIAN ASSISTANT

## 2018-09-27 NOTE — PATIENT INSTRUCTIONS
Check Blood sugars 4 x a day for the next week   Labs done (FASTING) call appointment  Bring lab slip from primary to Lab.

## 2018-09-27 NOTE — PROGRESS NOTES
"New Patient Consult Note  Referred by: JOAN Haro.      HPI:  Norma Jaimes is a  58 y.o. old patient who comes in today walking with walker and smells of smoke for evaluation and management of the following:      Patient's history is limited secondary to \"memory loss\" and history of \"PTSD\".  She recently moved here from [place] approximately 3 months ago at which time she was followed by an endocrinologist one time without any adjustment of her insulin.  She is currently living with her daughter and grandchildren in [place].  She feels very overwhelmed with her diabetes and chronic kidney disease stage III.  She seems generally motivated to improve her health care.      1. Uncontrolled type 2 diabetes mellitus with complication (CKD stage 3), with long-term current use of insulin (HCC)    According to the patient she was diagnosed with diabetes in 2005.  She does not recall her regimen at that time and/or her symptoms surrounding diagnosis.  According to the patient she was living in [place] and her health care was limited by her social situation and felt as if her blood sugars were never under control.  She recently moved here 3 months ago to be with her grandchildren and daughter and hopefully get her blood sugars and health care under better control.      She did not bring her blood sugar log today.  According to the patient her blood sugars have been ranging anywhere from 150-200.  She has a history of hyperglycemia and was previously on sliding scales which were not effective according to the patient.  She is currently on Lantus 80 units at night.  She previously was prescribed Humalog (200 - 20 units, 250-25 units, 300 units- 30).  According to the patient she has not been taking this for the last month secondary to a prescription air.  She states that she needs 90-day prescription in order to get her prescriptions refilled.      Patient has history of hypoglycemia with her " blood sugars in the 20s.  She denies ever having to be treated secondary to low blood sugars by medical staff. Patient is aware of hypoglycemia when her blood sugars are in the 30-40s.  At the present time these occur 1-2 times per month.  She drinks a glass of orange juice (8 ounces) and or takes any candy available at the time.    She is using a Relion Meter - 3-4 times a day.     Previous meds: Novolog       Patient complains of hypoglycemia, hyperglycemia, fatigue, shortness of breath, lightheadedness dizziness, numbness and tingling of her lower extremities skin changes of her hands and feet.  Brittle and dry nails.  Depression      2. Stage 3 chronic kidney disease- scheduled to [facility] 10/4/18       3. Neuropathy (HCC)- seeing [facility]. Currently on Lyrica. Patient states lyrica helps. Patient has arch supports     5. Mixed hyperlipidemia-currently on atorvastatin.      Preventative care:   Podiatry: has not seen 2017   Ophthalmology: 2017 screening done today 9/18   Diabetes education: has not seen. (referred 9/18)    Endocrinology history:  Diabetes type 2 diagnosed 2005   Currently taking Lantus 80 units per night  Metformin 500 mg twice daily  Humalog (not at the present) 200-20 units, 250-25 units, 300--30 units    Labs:    5/9/2018 estimated glucose 235, A1c 9.8, EGFR 47        ROS:  Constitutional: + weight gain,  Neg fever  HEENT: No difficulty with swallowing, change in voice, or swelling in throat area   Cardiac: No chest pain, palpitations, or racing heart  Resp: +chronic shortness of breath, +KEMP (does not use oxygen)   GI: No abdominal pain, nausea, vomiting, or diarrhea   Neuro: + numbness or tinging in feet  Endo: No heat or cold intolerance, + polyuria or polydipsia  Skin: changes dry brittle nails lesions on her hands and feet.    Past Medical History:  Patient Active Problem List    Diagnosis Date Noted   • Neuropathy (HCC) 09/27/2018   • Fatigue 09/27/2018   • Pain in both feet  09/11/2018   • Poor vision 09/11/2018   • Skin infection 09/11/2018   • Routine health maintenance 08/08/2018   • Uncontrolled type 2 diabetes mellitus with kidney complication, with long-term current use of insulin (HCC) 08/08/2018   • Colon polyps 08/08/2018   • Mixed hyperlipidemia 08/08/2018   • Tobacco abuse 08/08/2018   • Psychiatric disorder 08/08/2018   • Hot flashes 08/08/2018   • Other sleep apnea 08/08/2018   • Stage 3 chronic kidney disease 08/08/2018   • CAD, multiple vessel 08/08/2018   • Chronic pain syndrome 08/08/2018       Past Surgical History:  Past Surgical History:   Procedure Laterality Date   • ABDOMINAL HYSTERECTOMY TOTAL     • APPENDECTOMY     • COLONOSCOPY      history of colon        Allergies:  Baclofen; Ees [erythromycin]; and Erythromycin [akne-mycin]    Social History: lives with grandchildren and daughter   Social History     Social History   • Marital status:      Spouse name: N/A   • Number of children: N/A   • Years of education: N/A     Occupational History   • Not on file.     Social History Main Topics   • Smoking status: Current Every Day Smoker     Packs/day: 2.00     Years: 37.00     Types: Cigarettes   • Smokeless tobacco: Never Used      Comment: started smoking at age 22    • Alcohol use No   • Drug use: No   • Sexual activity: Not Currently     Other Topics Concern   • Not on file     Social History Narrative   • No narrative on file       Family History:   Family History   Problem Relation Age of Onset   • Heart Disease Mother    • Hypertension Mother    • Diabetes Father    • Cancer Maternal Grandfather    • Diabetes Paternal Grandmother    • Diabetes Paternal Grandfather    • Lung Disease Sister        Medications:    Current Outpatient Prescriptions:   •  furosemide (LASIX) 40 MG Tab, Take 1 Tab by mouth every day., Disp: 45 Tab, Rfl: 1  •  metoprolol (LOPRESSOR) 25 MG Tab, Take 1 Tab by mouth every day., Disp: 30 Tab, Rfl: 2  •  metFORMIN (GLUCOPHAGE) 500 MG  Tab, Take 1 Tab by mouth 2 times a day, with meals., Disp: 180 Tab, Rfl: 0  •  insulin glargine (LANTUS) 100 UNIT/ML Solution, Inject 80 Units as instructed at bedtime as needed., Disp: 60 mL, Rfl: 0  •  busPIRone (BUSPAR) 15 MG tablet, Take 0.5 Tabs by mouth 3 times a day., Disp: 90 Tab, Rfl: 0  •  hydrOXYzine HCl (ATARAX) 10 MG Tab, Take 1 Tab by mouth 2 times a day as needed for Itching., Disp: 60 Tab, Rfl: 0  •  doxycycline (MONODOX) 100 MG capsule, Take 1 Cap by mouth 2 times a day., Disp: 14 Cap, Rfl: 0  •  mupirocin (BACTROBAN) 2 % Ointment, Apply to skin infection twice daily, Disp: 1 Tube, Rfl: 2  •  venlafaxine XR (EFFEXOR XR) 75 MG CAPSULE SR 24 HR, Take 1 Cap by mouth every day., Disp: 30 Cap, Rfl: 1  •  Cranberry 1000 MG Cap, Take 1,000 mg by mouth every day., Disp: , Rfl:   •  nitroglycerin (NITRODUR) 0.2 MG/HR PATCH 24 HR, Apply 1 Patch to skin as directed every day., Disp: , Rfl:   •  Dexlansoprazole (DEXILANT) 60 MG CAPSULE DELAYED RELEASE delayed-release capsule, Take 60 mg by mouth every day., Disp: 30 Cap, Rfl: 2  •  Plecanatide (TRULANCE) 3 MG Tab, Take 3 mg by mouth every day., Disp: 30 Tab, Rfl: 1  •  aspirin-dipyridamole (AGGRENOX)  MG CAPSULE SR 12 HR, Take 1 Cap by mouth 2 times a day., Disp: 60 Cap, Rfl: 1  •  topiramate (TOPAMAX) 50 MG tablet, Take 1 Tab by mouth 2 times a day., Disp: 60 Tab, Rfl: 1  •  polyethylene glycol/lytes (MIRALAX) Pack, Take 1 Packet by mouth every day., Disp: 1 Each, Rfl: 1  •  methocarbamol (ROBAXIN) 500 MG Tab, Take 2 Tabs by mouth 3 times a day., Disp: 180 Tab, Rfl: 1  •  mirtazapine (REMERON) 45 MG tablet, Take 1 Tab by mouth 1 time daily as needed., Disp: 30 Tab, Rfl: 1  •  quetiapine (SEROQUEL) 400 MG tablet, Take 2 Tabs by mouth every bedtime., Disp: 60 Tab, Rfl: 1  •  gemfibrozil (LOPID) 600 MG Tab, Take 1 Tab by mouth 2 times a day., Disp: 60 Tab, Rfl: 1  •  atorvastatin (LIPITOR) 40 MG Tab, Take 1 Tab by mouth every day., Disp: 30 Tab, Rfl: 1  •   "levetiracetam (KEPPRA) 1000 MG tablet, Take 1.5 Tabs by mouth 2 Times a Day., Disp: 90 Tab, Rfl: 1  •  ranitidine (ZANTAC) 300 MG tablet, Take 1 Tab by mouth 2 Times a Day., Disp: 60 Tab, Rfl: 1  •  insulin lispro (HUMALOG) 100 UNIT/ML Solution, Inject 40 Units as instructed 3 times a day before meals., Disp: 20 mL, Rfl: 1  •  Insulin Syringe-Needle U-100 (INSULIN SYRINGE .5CC/30GX1/2\") 30G X 1/2\" 0.5 ML Misc, Two types of insulin, inject 4x day, Disp: 200 Each, Rfl: 1  •  albuterol (PROVENTIL HFA) 108 (90 Base) MCG/ACT Aero Soln inhalation aerosol, Inhale 2 Puffs by mouth every 6 hours as needed for Shortness of Breath., Disp: 8.5 g, Rfl: 2  •  potassium chloride SA (K-DUR) 10 MEQ Tab CR, Take 1 Tab by mouth 2 times a day., Disp: 60 Tab, Rfl: 1    Labs: Reviewed (as above)     Physical Examination:  Vital signs: /68   Ht 1.549 m (5' 1\")   Wt 108.6 kg (239 lb 6.4 oz)   SpO2 93%   BMI 45.23 kg/m²  Body mass index is 45.23 kg/m².  General: sob with ambulation, smells of smoke, tearful at time, ambulates with cane, cooperative,   Eyes: No scleral icterus or discharge, no nystagmus  ENMT: Normal on external inspection of nose, lips, normal thyroid exam  Neck: No abnormal masses on inspection or palpations. no bruits noted   Resp: Normal effort, distant breath sounds   CVS: Regular rate and rhythm, S1 S2 normal, no murmur, rubs or gallops    Extremities: No edema.   Abdomen: abdominal obesity present,   Neuro: Alert and oriented  Psych: Normal mood and affect, intact memory and able to make informed decisions    Assessment and Plan:    1. Uncontrolled type 2 diabetes mellitus with complication, with long-term current use of insulin (HCC)  We discussed in detail with regards to her current management of diabetes.  Her EGFR from 5/2018 was less than optimal at a level of 47.  We discussed the potential issues of using metformin with lower EGFR.    I will plan on seeing her back in approximately 2 weeks time to " allow her to have time for her labs and also bring in blood sugar log.  At that time we will evaluate and change any medications based upon laboratory findings and blood sugar readings.    She was given a referral for ophthalmology, podiatry and diabetes education.  She was given educational materials in the office today.    2. Stage 3 chronic kidney disease she is scheduled with Dr. Ortiz in the near future.      3. Neuropathy (HCC) as per [facility].  She is currently maintained on Lyrica.  We did discuss footwear secondary to her numbness and tingling of her lower extremities.      4. Fatigue, unspecified type I will rule out B12 deficiency and vitamin D deficiency secondary to history of fatigue.  I have also discussed with the patient she needs referral and also to be seen by pulmonary, cardiology, and nephrology.      5. Mixed hyperlipidemia we will repeat labs.  She will continue on atorvastatin at this time will make adjustments based on her laboratory findings.        Return in about 2 weeks (around 10/11/2018).     Thank you for allowing me to participate in the care of this patient.  If you have any questions or concerns please do not hesitate to contact me.    Lovely Bailey P.A.-C.  09/27/18    CC:   Cipriano Camargo A.P.N.    This note was created using voice recognition software (Dragon). The accuracy of the dictation is limited by the abilities of the software. I have reviewed the note prior to signing, however some errors in grammar and context are still possible. If you have any questions related to this note please do not hesitate to contact our office.

## 2018-09-28 ENCOUNTER — HOSPITAL ENCOUNTER (OUTPATIENT)
Dept: LAB | Facility: MEDICAL CENTER | Age: 58
End: 2018-09-28
Attending: PHYSICIAN ASSISTANT
Payer: MEDICAID

## 2018-09-28 ENCOUNTER — HOSPITAL ENCOUNTER (OUTPATIENT)
Dept: LAB | Facility: MEDICAL CENTER | Age: 58
End: 2018-09-28
Attending: NURSE PRACTITIONER
Payer: MEDICAID

## 2018-09-28 VITALS
BODY MASS INDEX: 45.2 KG/M2 | HEIGHT: 61 IN | DIASTOLIC BLOOD PRESSURE: 68 MMHG | OXYGEN SATURATION: 93 % | WEIGHT: 239.4 LBS | SYSTOLIC BLOOD PRESSURE: 122 MMHG | HEART RATE: 85 BPM

## 2018-09-28 DIAGNOSIS — Z13.0 SCREENING FOR IRON DEFICIENCY ANEMIA: ICD-10-CM

## 2018-09-28 DIAGNOSIS — Z13.29 SCREENING FOR THYROID DISORDER: ICD-10-CM

## 2018-09-28 DIAGNOSIS — Z13.1 SCREENING FOR DIABETES MELLITUS: ICD-10-CM

## 2018-09-28 DIAGNOSIS — Z13.220 SCREENING FOR CHOLESTEROL LEVEL: ICD-10-CM

## 2018-09-28 DIAGNOSIS — Z13.21 ENCOUNTER FOR VITAMIN DEFICIENCY SCREENING: ICD-10-CM

## 2018-09-28 DIAGNOSIS — R53.83 FATIGUE, UNSPECIFIED TYPE: ICD-10-CM

## 2018-09-28 LAB
25(OH)D3 SERPL-MCNC: 9 NG/ML (ref 30–100)
25(OH)D3 SERPL-MCNC: 9 NG/ML (ref 30–100)
ALBUMIN SERPL BCP-MCNC: 4.4 G/DL (ref 3.2–4.9)
ALBUMIN SERPL BCP-MCNC: 4.5 G/DL (ref 3.2–4.9)
ALBUMIN/GLOB SERPL: 1 G/DL
ALBUMIN/GLOB SERPL: 1.3 G/DL
ALP SERPL-CCNC: 101 U/L (ref 30–99)
ALP SERPL-CCNC: 106 U/L (ref 30–99)
ALT SERPL-CCNC: 15 U/L (ref 2–50)
ALT SERPL-CCNC: 18 U/L (ref 2–50)
ANION GAP SERPL CALC-SCNC: 10 MMOL/L (ref 0–11.9)
ANION GAP SERPL CALC-SCNC: 12 MMOL/L (ref 0–11.9)
AST SERPL-CCNC: 18 U/L (ref 12–45)
AST SERPL-CCNC: 21 U/L (ref 12–45)
BASOPHILS # BLD AUTO: 0.6 % (ref 0–1.8)
BASOPHILS # BLD: 0.06 K/UL (ref 0–0.12)
BILIRUB SERPL-MCNC: 0.4 MG/DL (ref 0.1–1.5)
BILIRUB SERPL-MCNC: 0.4 MG/DL (ref 0.1–1.5)
BUN SERPL-MCNC: 27 MG/DL (ref 8–22)
BUN SERPL-MCNC: 27 MG/DL (ref 8–22)
CALCIUM SERPL-MCNC: 10.3 MG/DL (ref 8.5–10.5)
CALCIUM SERPL-MCNC: 9.9 MG/DL (ref 8.5–10.5)
CHLORIDE SERPL-SCNC: 106 MMOL/L (ref 96–112)
CHLORIDE SERPL-SCNC: 108 MMOL/L (ref 96–112)
CHOLEST SERPL-MCNC: 264 MG/DL (ref 100–199)
CO2 SERPL-SCNC: 22 MMOL/L (ref 20–33)
CO2 SERPL-SCNC: 22 MMOL/L (ref 20–33)
CREAT SERPL-MCNC: 1.34 MG/DL (ref 0.5–1.4)
CREAT SERPL-MCNC: 1.37 MG/DL (ref 0.5–1.4)
CREAT UR-MCNC: 26.3 MG/DL
EOSINOPHIL # BLD AUTO: 0.24 K/UL (ref 0–0.51)
EOSINOPHIL NFR BLD: 2.6 % (ref 0–6.9)
ERYTHROCYTE [DISTWIDTH] IN BLOOD BY AUTOMATED COUNT: 47.1 FL (ref 35.9–50)
ERYTHROCYTE [DISTWIDTH] IN BLOOD BY AUTOMATED COUNT: 47.6 FL (ref 35.9–50)
EST. AVERAGE GLUCOSE BLD GHB EST-MCNC: 180 MG/DL
EST. AVERAGE GLUCOSE BLD GHB EST-MCNC: 183 MG/DL
GLOBULIN SER CALC-MCNC: 3.4 G/DL (ref 1.9–3.5)
GLOBULIN SER CALC-MCNC: 4.3 G/DL (ref 1.9–3.5)
GLUCOSE SERPL-MCNC: 170 MG/DL (ref 65–99)
GLUCOSE SERPL-MCNC: 178 MG/DL (ref 65–99)
HBA1C MFR BLD: 7.9 % (ref 0–5.6)
HBA1C MFR BLD: 8 % (ref 0–5.6)
HCT VFR BLD AUTO: 44.8 % (ref 37–47)
HCT VFR BLD AUTO: 45 % (ref 37–47)
HDLC SERPL-MCNC: 55 MG/DL
HGB BLD-MCNC: 14.1 G/DL (ref 12–16)
HGB BLD-MCNC: 14.2 G/DL (ref 12–16)
IMM GRANULOCYTES # BLD AUTO: 0.06 K/UL (ref 0–0.11)
IMM GRANULOCYTES NFR BLD AUTO: 0.6 % (ref 0–0.9)
IRON SATN MFR SERPL: 26 % (ref 15–55)
IRON SERPL-MCNC: 123 UG/DL (ref 40–170)
LDLC SERPL CALC-MCNC: 160 MG/DL
LYMPHOCYTES # BLD AUTO: 3.93 K/UL (ref 1–4.8)
LYMPHOCYTES NFR BLD: 42.3 % (ref 22–41)
MCH RBC QN AUTO: 27.8 PG (ref 27–33)
MCH RBC QN AUTO: 28.1 PG (ref 27–33)
MCHC RBC AUTO-ENTMCNC: 31.3 G/DL (ref 33.6–35)
MCHC RBC AUTO-ENTMCNC: 31.7 G/DL (ref 33.6–35)
MCV RBC AUTO: 88.5 FL (ref 81.4–97.8)
MCV RBC AUTO: 88.8 FL (ref 81.4–97.8)
MICROALBUMIN UR-MCNC: <0.7 MG/DL
MICROALBUMIN/CREAT UR: NORMAL MG/G (ref 0–30)
MONOCYTES # BLD AUTO: 0.81 K/UL (ref 0–0.85)
MONOCYTES NFR BLD AUTO: 8.7 % (ref 0–13.4)
NEUTROPHILS # BLD AUTO: 4.2 K/UL (ref 2–7.15)
NEUTROPHILS NFR BLD: 45.2 % (ref 44–72)
NRBC # BLD AUTO: 0 K/UL
NRBC BLD-RTO: 0 /100 WBC
PLATELET # BLD AUTO: 454 K/UL (ref 164–446)
PLATELET # BLD AUTO: 458 K/UL (ref 164–446)
PMV BLD AUTO: 10.9 FL (ref 9–12.9)
PMV BLD AUTO: 11 FL (ref 9–12.9)
POTASSIUM SERPL-SCNC: 4 MMOL/L (ref 3.6–5.5)
POTASSIUM SERPL-SCNC: 4.1 MMOL/L (ref 3.6–5.5)
PROT SERPL-MCNC: 7.9 G/DL (ref 6–8.2)
PROT SERPL-MCNC: 8.7 G/DL (ref 6–8.2)
RBC # BLD AUTO: 5.06 M/UL (ref 4.2–5.4)
RBC # BLD AUTO: 5.07 M/UL (ref 4.2–5.4)
SODIUM SERPL-SCNC: 140 MMOL/L (ref 135–145)
SODIUM SERPL-SCNC: 140 MMOL/L (ref 135–145)
T4 FREE SERPL-MCNC: 0.46 NG/DL (ref 0.53–1.43)
TIBC SERPL-MCNC: 480 UG/DL (ref 250–450)
TRIGL SERPL-MCNC: 245 MG/DL (ref 0–149)
TSH SERPL DL<=0.005 MIU/L-ACNC: 1.19 UIU/ML (ref 0.38–5.33)
TSH SERPL DL<=0.005 MIU/L-ACNC: 1.23 UIU/ML (ref 0.38–5.33)
VIT B12 SERPL-MCNC: 323 PG/ML (ref 211–911)
VIT B12 SERPL-MCNC: 333 PG/ML (ref 211–911)
WBC # BLD AUTO: 9.3 K/UL (ref 4.8–10.8)
WBC # BLD AUTO: 9.5 K/UL (ref 4.8–10.8)

## 2018-09-28 PROCEDURE — 83036 HEMOGLOBIN GLYCOSYLATED A1C: CPT

## 2018-09-28 PROCEDURE — 85027 COMPLETE CBC AUTOMATED: CPT

## 2018-09-28 PROCEDURE — 82570 ASSAY OF URINE CREATININE: CPT

## 2018-09-28 PROCEDURE — 80053 COMPREHEN METABOLIC PANEL: CPT | Mod: 91

## 2018-09-28 PROCEDURE — 82306 VITAMIN D 25 HYDROXY: CPT | Mod: 91

## 2018-09-28 PROCEDURE — 85025 COMPLETE CBC W/AUTO DIFF WBC: CPT

## 2018-09-28 PROCEDURE — 83540 ASSAY OF IRON: CPT

## 2018-09-28 PROCEDURE — 36415 COLL VENOUS BLD VENIPUNCTURE: CPT

## 2018-09-28 PROCEDURE — 82607 VITAMIN B-12: CPT | Mod: 91

## 2018-09-28 PROCEDURE — 80061 LIPID PANEL: CPT

## 2018-09-28 PROCEDURE — 83036 HEMOGLOBIN GLYCOSYLATED A1C: CPT | Mod: 91

## 2018-09-28 PROCEDURE — 84443 ASSAY THYROID STIM HORMONE: CPT | Mod: 91

## 2018-09-28 PROCEDURE — 82306 VITAMIN D 25 HYDROXY: CPT

## 2018-09-28 PROCEDURE — 82607 VITAMIN B-12: CPT

## 2018-09-28 PROCEDURE — 84443 ASSAY THYROID STIM HORMONE: CPT

## 2018-09-28 PROCEDURE — 83550 IRON BINDING TEST: CPT

## 2018-09-28 PROCEDURE — 80053 COMPREHEN METABOLIC PANEL: CPT

## 2018-09-28 PROCEDURE — 84439 ASSAY OF FREE THYROXINE: CPT

## 2018-09-28 PROCEDURE — 82043 UR ALBUMIN QUANTITATIVE: CPT

## 2018-09-28 PROCEDURE — 84681 ASSAY OF C-PEPTIDE: CPT

## 2018-09-28 PROCEDURE — 86341 ISLET CELL ANTIBODY: CPT

## 2018-10-01 DIAGNOSIS — E78.2 MIXED HYPERLIPIDEMIA: ICD-10-CM

## 2018-10-01 DIAGNOSIS — I10 ESSENTIAL HYPERTENSION: ICD-10-CM

## 2018-10-01 DIAGNOSIS — F99 PSYCHIATRIC DISORDER: ICD-10-CM

## 2018-10-01 DIAGNOSIS — G40.909 SEIZURE DISORDER (HCC): ICD-10-CM

## 2018-10-01 DIAGNOSIS — K21.00 GASTROESOPHAGEAL REFLUX DISEASE WITH ESOPHAGITIS: ICD-10-CM

## 2018-10-01 DIAGNOSIS — M79.7 FIBROMYALGIA: ICD-10-CM

## 2018-10-01 LAB — C PEPTIDE SERPL-MCNC: 6.2 NG/ML (ref 0.8–3.5)

## 2018-10-02 LAB — GAD65 AB SER IA-ACNC: <5 IU/ML (ref 0–5)

## 2018-10-02 RX ORDER — GEMFIBROZIL 600 MG/1
TABLET, FILM COATED ORAL
Qty: 60 TAB | Refills: 0 | Status: SHIPPED | OUTPATIENT
Start: 2018-10-02 | End: 2018-12-31

## 2018-10-02 RX ORDER — MIRTAZAPINE 45 MG/1
45 TABLET, FILM COATED ORAL
Qty: 30 TAB | Refills: 0 | Status: SHIPPED | OUTPATIENT
Start: 2018-10-02 | End: 2019-04-01 | Stop reason: SDUPTHER

## 2018-10-02 RX ORDER — RANITIDINE 300 MG/1
TABLET ORAL
Qty: 60 TAB | Refills: 0 | Status: SHIPPED | OUTPATIENT
Start: 2018-10-02 | End: 2018-10-15 | Stop reason: SDUPTHER

## 2018-10-02 RX ORDER — LEVETIRACETAM 1000 MG/1
1500 TABLET ORAL 2 TIMES DAILY
Qty: 90 TAB | Refills: 0 | Status: SHIPPED | OUTPATIENT
Start: 2018-10-02 | End: 2018-10-15 | Stop reason: SDUPTHER

## 2018-10-02 RX ORDER — TOPIRAMATE 50 MG/1
TABLET, FILM COATED ORAL
Qty: 60 TAB | Refills: 0 | Status: SHIPPED | OUTPATIENT
Start: 2018-10-02 | End: 2018-10-15 | Stop reason: SDUPTHER

## 2018-10-02 RX ORDER — POTASSIUM CHLORIDE 750 MG/1
TABLET, EXTENDED RELEASE ORAL
Qty: 60 TAB | Refills: 0 | Status: SHIPPED | OUTPATIENT
Start: 2018-10-02 | End: 2018-10-15 | Stop reason: SDUPTHER

## 2018-10-02 NOTE — TELEPHONE ENCOUNTER
Please call Norma and let her know she needs to obtain her Psychiatric medications from Psychiatry and she should be seeing [facility] for Psychiatry and counseling  [facility]  [address]  [place] [id number]  Phone: [phone]

## 2018-10-04 ENCOUNTER — TELEPHONE (OUTPATIENT)
Dept: ENDOCRINOLOGY | Facility: MEDICAL CENTER | Age: 58
End: 2018-10-04

## 2018-10-04 ENCOUNTER — OFFICE VISIT (OUTPATIENT)
Dept: NEPHROLOGY | Facility: MEDICAL CENTER | Age: 58
End: 2018-10-04
Payer: MEDICAID

## 2018-10-04 VITALS
HEART RATE: 80 BPM | RESPIRATION RATE: 18 BRPM | WEIGHT: 246 LBS | OXYGEN SATURATION: 95 % | BODY MASS INDEX: 40.98 KG/M2 | SYSTOLIC BLOOD PRESSURE: 128 MMHG | TEMPERATURE: 97.6 F | DIASTOLIC BLOOD PRESSURE: 68 MMHG | HEIGHT: 65 IN

## 2018-10-04 DIAGNOSIS — E11.29 MICROALBUMINURIA DUE TO TYPE 2 DIABETES MELLITUS (HCC): ICD-10-CM

## 2018-10-04 DIAGNOSIS — E55.9 VITAMIN D DEFICIENCY: ICD-10-CM

## 2018-10-04 DIAGNOSIS — I10 ESSENTIAL HYPERTENSION: ICD-10-CM

## 2018-10-04 DIAGNOSIS — R80.9 MICROALBUMINURIA DUE TO TYPE 2 DIABETES MELLITUS (HCC): ICD-10-CM

## 2018-10-04 DIAGNOSIS — N18.30 STAGE 3 CHRONIC KIDNEY DISEASE (HCC): ICD-10-CM

## 2018-10-04 PROCEDURE — 99204 OFFICE O/P NEW MOD 45 MIN: CPT | Performed by: INTERNAL MEDICINE

## 2018-10-04 RX ORDER — PREGABALIN 200 MG/1
200 CAPSULE ORAL 2 TIMES DAILY
COMMUNITY
End: 2018-10-15 | Stop reason: SDUPTHER

## 2018-10-04 RX ORDER — OXYCODONE HYDROCHLORIDE AND ACETAMINOPHEN 325; 5 MG/5ML; MG/5ML
5 SOLUTION ORAL EVERY 4 HOURS PRN
COMMUNITY
End: 2019-01-07

## 2018-10-04 ASSESSMENT — ENCOUNTER SYMPTOMS
NAUSEA: 0
VOMITING: 0
PALPITATIONS: 0
CHILLS: 0
BACK PAIN: 1
MYALGIAS: 1
WHEEZING: 1
SORE THROAT: 0
SHORTNESS OF BREATH: 0
FEVER: 0
FLANK PAIN: 0
WEIGHT LOSS: 0
HEARTBURN: 0
ORTHOPNEA: 0
COUGH: 1
ABDOMINAL PAIN: 0

## 2018-10-05 NOTE — TELEPHONE ENCOUNTER
Called patient secondary to labs: left message     Glucose 170   Bun 27, Cr 1.34   egfr 41/40 --- needs to decrease metformin to 500mg daily    a1c 8.0-   Vitamin D 9!--- needs replacement Vitamin D 75232 IU weekly     Please make sure she has appt

## 2018-10-05 NOTE — PROGRESS NOTES
"Subjective:      Norma Jaimes is a 58 y.o. female who presents with New Patient and Chronic Kidney Disease            HPI  Norma is coming today for initial evaluation of CKD III  C/o muscle aching , low back pain,nasal congestion  (+) urinary symptoms with hesitancy to urinate  HTN: BP well controlled  CKD III: creat at 1.3's -likely baseline  (+) tobacco -2 ppd    Review of Systems   Constitutional: Positive for malaise/fatigue. Negative for chills, fever and weight loss.   HENT: Positive for congestion. Negative for nosebleeds and sore throat.    Respiratory: Positive for cough and wheezing. Negative for shortness of breath.    Cardiovascular: Negative for chest pain, palpitations and orthopnea.   Gastrointestinal: Negative for abdominal pain, heartburn, nausea and vomiting.   Genitourinary: Negative for dysuria, flank pain, frequency, hematuria and urgency.   Musculoskeletal: Positive for back pain, joint pain and myalgias.   Skin: Negative.    All other systems reviewed and are negative.         Objective:     /68 (BP Location: Right arm, Patient Position: Sitting)   Pulse 80   Temp 36.4 °C (97.6 °F)   Resp 18   Ht 1.651 m (5' 5\")   Wt 111.6 kg (246 lb)   SpO2 95%   BMI 40.94 kg/m²      Physical Exam   Constitutional: She is oriented to person, place, and time. She appears well-developed and well-nourished. No distress.   HENT:   Head: Normocephalic and atraumatic.   Nose: Nose normal.   Mouth/Throat: Oropharynx is clear and moist.   Eyes: Pupils are equal, round, and reactive to light. Conjunctivae and EOM are normal.   Neck: Normal range of motion. Neck supple. No thyromegaly present.   Cardiovascular: Normal rate, regular rhythm and normal heart sounds.  Exam reveals no gallop and no friction rub.    Pulmonary/Chest: Effort normal. She has wheezes.   Coarse BS   Abdominal: Soft. Bowel sounds are normal. She exhibits no distension. There is no tenderness.   Musculoskeletal: She " exhibits no edema.   Neurological: She is alert and oriented to person, place, and time. No cranial nerve deficit. Coordination normal.   Skin: Skin is warm. No rash noted. No erythema.   Nursing note and vitals reviewed.         Laboratory results reviewed: d/w pt  Lab Results   Component Value Date/Time    CREATININE 1.34 09/28/2018 10:01 AM    POTASSIUM 4.0 09/28/2018 10:01 AM          Assessment/Plan:     1. Stage 3 chronic kidney disease (HCC)       stable  - URINALYSIS; Future  - BASIC METABOLIC PANEL; Future  - US-RENAL; Future    2. Essential hypertension      BP well controlled    3. Microalbuminuria due to type 2 diabetes mellitus (HCC)      No microalbuminuria    4. Vitamin D deficiency      Low vit D level -to start supplementation  - VITAMIN D 25-HYDROXY    Recs: to stop smoking             Keep well hydrated             Low Na diet             No NSAID's             F/u in 3 months    Thank you for the consult

## 2018-10-08 LAB — AMBIGUOUS DTTM AMBI4: NORMAL

## 2018-10-09 ENCOUNTER — APPOINTMENT (OUTPATIENT)
Dept: ENDOCRINOLOGY | Facility: MEDICAL CENTER | Age: 58
End: 2018-10-09
Payer: MEDICAID

## 2018-10-09 DIAGNOSIS — I25.10 CAD, MULTIPLE VESSEL: ICD-10-CM

## 2018-10-09 RX ORDER — ASPIRIN/DIPYRIDAMOLE 25MG-200MG
CAPSULE,EXTENDED RELEASE MULTIPHASE 12HR ORAL
Qty: 60 CAP | Refills: 1 | Status: SHIPPED | OUTPATIENT
Start: 2018-10-09 | End: 2018-12-12 | Stop reason: SDUPTHER

## 2018-10-10 ENCOUNTER — OFFICE VISIT (OUTPATIENT)
Dept: MEDICAL GROUP | Facility: MEDICAL CENTER | Age: 58
End: 2018-10-10
Attending: NURSE PRACTITIONER
Payer: MEDICAID

## 2018-10-10 VITALS
WEIGHT: 232 LBS | TEMPERATURE: 97 F | OXYGEN SATURATION: 93 % | RESPIRATION RATE: 20 BRPM | HEIGHT: 65 IN | HEART RATE: 92 BPM | BODY MASS INDEX: 38.65 KG/M2

## 2018-10-10 DIAGNOSIS — N18.30 STAGE 3 CHRONIC KIDNEY DISEASE (HCC): ICD-10-CM

## 2018-10-10 DIAGNOSIS — E78.2 MIXED HYPERLIPIDEMIA: ICD-10-CM

## 2018-10-10 DIAGNOSIS — G89.4 CHRONIC PAIN SYNDROME: ICD-10-CM

## 2018-10-10 DIAGNOSIS — F99 PSYCHIATRIC DISORDER: ICD-10-CM

## 2018-10-10 DIAGNOSIS — E55.9 VITAMIN D DEFICIENCY: ICD-10-CM

## 2018-10-10 DIAGNOSIS — Z74.8 ASSISTANCE NEEDED WITH TRANSPORTATION: ICD-10-CM

## 2018-10-10 PROCEDURE — 99214 OFFICE O/P EST MOD 30 MIN: CPT | Performed by: NURSE PRACTITIONER

## 2018-10-10 RX ORDER — CYCLOBENZAPRINE HCL 5 MG
5-10 TABLET ORAL 3 TIMES DAILY PRN
Qty: 30 TAB | Refills: 0 | Status: SHIPPED | OUTPATIENT
Start: 2018-10-10 | End: 2018-11-14 | Stop reason: SDUPTHER

## 2018-10-10 RX ORDER — ERGOCALCIFEROL 1.25 MG/1
50000 CAPSULE ORAL
Qty: 12 CAP | Refills: 2 | Status: SHIPPED | OUTPATIENT
Start: 2018-10-10 | End: 2019-01-07

## 2018-10-10 RX ORDER — SIMVASTATIN 10 MG
10 TABLET ORAL EVERY EVENING
Qty: 30 TAB | Refills: 2 | Status: SHIPPED | OUTPATIENT
Start: 2018-10-10 | End: 2019-01-07

## 2018-10-10 NOTE — ASSESSMENT & PLAN NOTE
Reviewed labs including BS and A1c, GFR reduced  Is followed by Endocrine now and had Metformin reduced to 500 mg daily due to reduced GFR

## 2018-10-10 NOTE — ASSESSMENT & PLAN NOTE
Reports needs RTC paperwork completed so she can get special transportation as not able to ride public buses.  Hx of Chronic pain, Poor Vision, Diabetic neuropathy.  CAD.     Patient reports uses cane, has pain in hips and low back, moves slowly and this is why she needs special RTC accommodations.  Chart reviewed, RTC Paperwork completed and [employer] by Meggan PUGA MA and paperwork scanned into media

## 2018-10-10 NOTE — PROGRESS NOTES
"Chief Complaint:   Chief Complaint   Patient presents with   • Medication Problem     muscle relaxer on backorder    • Results     labs   • Other     RTC paperwork        HPI:  Norma is here today for a follow-up on Results, Medication Review, Transportation assist paperwork    [place]  Report:   No entries     Her PMH includes:  Anxiety and Depression  Bipolar Disorder  Fibromyalgia  Seizures  HTN  MAGNOLIA  CAD  DM  Morbid Obesity  \"fibromyalgia\"  Chronic pain  Colon Polyps  Hiatal Hernia  Hyperlipidemia  Short term memory concerns  Kidney Disease  Tobacco abuse- smoking  Constipation  GERD  Cervical/Uterine Cancer w hysterectomy      Referrals Approved:  Cardiology, Nephrology, GYN, Sleep Study, Psychiatry, Psychology, GI  Podiatry, Ophthalmology, [facility], Diabetic Education     [place]  Report:  8/30/18 Percocet 5/325 # 28 by Aung Degroot ( Pain Clinic)  8/22/18 Lyrica 200 mg # 60 by me  -----------------------------------------------------------------------------     Review of Records:  10/4/18 Endocrine DAVID MARRERO. GFR reduced, Patient to decrease Metformin  To 500 mg/day.  10/4/18 DR Ortiz Nephrology appt. Start on Vit D, Avoid NSAID's  10/1/18 T.E for Norma to obtain Psych meds from Psychiatry([facility])  9/28/18 Labs-   CBC normal, CMP normal except BS= 170, A1C= 8.0, GFR= 49/41  Cholesterol Total= 264, Gqqhers=729, HDL= 50, LDL=160,  TSH= 1.23  Vit B12 normal, Vit D= 9 (low)  C-pepitide= 6.2  9/27/18 Endocrine appt ludy ROMERO.  9/11/18 F/u on multiple concerns. REferred to Ophthalmology, Endocrine, Podiatry.  To continue w [facility] and also w her Cardiologist.  RX Buspar, Continue other Psych meds, To see Psychiatrist as planned on 9/25/18 and   RX Doxycycline and Bactroban.  9/11/18NO BLOOD WORK COMPLETED BY PATIENT   Upcoming appts\"  12/3/18 Pulmonary for Sleep Study  10/4/18 Nephrology r/t CKD  9/25 Psychiatry- Dr Khan  9/20 GYN  9/20/18 Cardiology r/t CAD, HTN.  8/29/18 [facility] " Consult -1st appt  8/28/18 Mammogram normal  8/10/18 Fit Stool Test= Negative  8/8/18 Clinic for New Patient appt, Multiple concerns/issues. ---> Labs ordered. Fit Test Ordered,  ---->  At new patient Clinic Visit - Multiple Referrals placed:  GYN  Psychiatry  Psychology  Nephrology  Cardiology  Pain Clinic  Sleep STudy.  GI for hx of colon polyps       Stage 3 chronic kidney disease  Labs conform CKD-3  She recently saw Nephrology- DR Ortiz and started on Vit D  To avoid NSAID.    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  Discussed why optimal level of Vitamin D is important to health  Recommended Supplemental Vitamin D  She was started on weekly Vit D by Endocrine.    Mixed hyperlipidemia  Pt has known hyperlipidemia  We reviewed her latest lipid panel showing   elev total, elev trigly and elev LDL  She reports she is taking Gemfibrozil 600 mg BID.   Has been on it 1 yr.  WE discussed importance of getting lipid level down  Recommend we add Statin- low dose and she is to reduce   Sugar and saturated fats.    I have recommended she make appt to see Cardiology within next 30 days.  Will start on low dose Simvastatin and have her continue Gemfibrozil for now.    Assistance needed with transportation  Reports needs RTC paperwork completed so she can get special transportation as not able to ride public buses.  Hx of Chronic pain, Poor Vision, Diabetic neuropathy.  CAD.     Patient reports uses cane, has pain in hips and low back, moves slowly and this is why she needs special RTC accommodations.  Chart reviewed, RTC Paperwork completed and [employer] by Meggan PUGA MA and paperwork scanned into media    Psychiatric disorder  [facility] app[facility] at [facility]  PTSD,  Bipolar, Anxiety, Psychosis/ADHD  Narcicisstic. Per report    States increased Buspar 15 mg x 2  TID. Hydroxyzine , Seroquel and Venlafaxine.    Reports just started them.  Denies suicidal ideation       Chronic pain syndrome  States no longer getting Robaxin as backordered  Discussed change to Flexeril.  Pt to make appt w [facility] for pain management      Patient Active Problem List    Diagnosis Date Noted   • Vitamin D deficiency 10/10/2018   • Assistance needed with transportation 10/10/2018   • Microalbuminuria due to type 2 diabetes mellitus ([facility]) 10/04/2018   • Neuropathy ([facility]) 09/27/2018   • Fatigue 09/27/2018   • Pain in both feet 09/11/2018   • Poor vision 09/11/2018   • Skin infection 09/11/2018   • Routine health maintenance 08/08/2018   • Uncontrolled type 2 diabetes mellitus with kidney complication, with long-term current use of insulin ([facility]) 08/08/2018   • Colon polyps 08/08/2018   • Mixed hyperlipidemia 08/08/2018   • Tobacco abuse 08/08/2018   • Psychiatric disorder 08/08/2018   • Hot flashes 08/08/2018   • Other sleep apnea 08/08/2018   • Stage 3 chronic kidney disease ([facility]) 08/08/2018   • CAD, multiple vessel 08/08/2018   • Chronic pain syndrome 08/08/2018       Allergies:Baclofen; Ees [erythromycin]; and Erythromycin [akne-mycin]    Medicines as of today:  Current Outpatient Prescriptions   Medication Sig Dispense Refill   • ergocalciferol (DRISDOL) 85677 UNIT capsule Take 1 Cap by mouth every 7 days. 12 Cap 2   • simvastatin (ZOCOR) 10 MG Tab Take 1 Tab by mouth every evening. 30 Tab 2   • cyclobenzaprine (FLEXERIL) 5 MG tablet Take 1-2 Tabs by mouth 3 times a day as needed. 30 Tab 0   • Blood Glucose Monitoring Suppl (ONE TOUCH ULTRA SYSTEM KIT) w/Device Kit 1 glucometer for use w DM-2 , ON metformin and Insulin Check 4/day 1 Kit 0   • ONE TOUCH LANCETS Misc Test bld sugar 4x per day w lancets 200 Each 2   • glucose blood (ONE TOUCH ULTRA TEST) strip 1 Strip by Other route 4 times a day.  150 Strip 2   • hydrOXYzine HCl (ATARAX) 10 MG Tab TAKE 1 TAB BY MOUTH 2 TIMES A DAY AS NEEDED FOR ITCHING. 60 Tab 1   • AGGRENOX  MG CAPSULE SR 12 HR TAKE 1 CAPSULE BY MOUTH TWICE A DAY 60 Cap 1   • Lancet Devices Misc 1 Applicator by Does not apply route 3 times a day. 100 Applicator 11   • Blood Glucose Monitoring Suppl Supplies Misc One Touch Verio Flex meter 1 Each 0   • Blood Glucose Monitoring Suppl Supplies Misc One Touch Verio test strips testing 3 times daily testing for insulin adjustment. 100 Strip 11   • oxyCODONE-acetaminophen (ROXICET) 5-325 MG/5ML Solution Take 5 mL by mouth every four hours as needed.     • pregabalin (LYRICA) 200 MG capsule Take 200 mg by mouth 2 times a day.     • mirtazapine (REMERON) 45 MG tablet TAKE 1 TAB BY MOUTH 1 TIME DAILY AS NEEDED. 30 Tab 0   • topiramate (TOPAMAX) 50 MG tablet TAKE 1 TABLET BY MOUTH TWICE A DAY 60 Tab 0   • levetiracetam (KEPPRA) 1000 MG tablet TAKE 1.5 TABS BY MOUTH 2 TIMES A DAY. 90 Tab 0   • gemfibrozil (LOPID) 600 MG Tab TAKE 1 TAB BY MOUTH TWICE DAILY 60 Tab 0   • potassium chloride SA (K-DUR) 10 MEQ Tab CR TAKE 1 TABLET BY MOUTH TWICE A DAY 60 Tab 0   • ranitidine (ZANTAC) 300 MG tablet TAKE 1 TABLET BY MOUTH TWICE A DAY 60 Tab 0   • furosemide (LASIX) 40 MG Tab Take 1 Tab by mouth every day. 45 Tab 1   • metoprolol (LOPRESSOR) 25 MG Tab Take 1 Tab by mouth every day. 30 Tab 2   • metFORMIN (GLUCOPHAGE) 500 MG Tab Take 1 Tab by mouth 2 times a day, with meals. 180 Tab 0   • insulin glargine (LANTUS) 100 UNIT/ML Solution Inject 80 Units as instructed at bedtime as needed. 60 mL 0   • busPIRone (BUSPAR) 15 MG tablet Take 0.5 Tabs by mouth 3 times a day. 90 Tab 0   • doxycycline (MONODOX) 100 MG capsule Take 1 Cap by mouth 2 times a day. 14 Cap 0   • mupirocin (BACTROBAN) 2 % Ointment Apply to skin infection twice daily 1 Tube 2   • venlafaxine XR (EFFEXOR XR) 75 MG CAPSULE SR 24 HR Take 1 Cap by mouth every day. 30 Cap 1   • Cranberry 1000 MG  "Cap Take 1,000 mg by mouth every day.     • nitroglycerin (NITRODUR) 0.2 MG/HR PATCH 24 HR Apply 1 Patch to skin as directed every day.     • Dexlansoprazole (DEXILANT) 60 MG CAPSULE DELAYED RELEASE delayed-release capsule Take 60 mg by mouth every day. 30 Cap 2   • Plecanatide (TRULANCE) 3 MG Tab Take 3 mg by mouth every day. 30 Tab 1   • polyethylene glycol/lytes (MIRALAX) Pack Take 1 Packet by mouth every day. 1 Each 1   • quetiapine (SEROQUEL) 400 MG tablet Take 2 Tabs by mouth every bedtime. 60 Tab 1   • atorvastatin (LIPITOR) 40 MG Tab Take 1 Tab by mouth every day. 30 Tab 1   • insulin lispro (HUMALOG) 100 UNIT/ML Solution Inject 40 Units as instructed 3 times a day before meals. 20 mL 1   • Insulin Syringe-Needle U-100 (INSULIN SYRINGE .5CC/30GX1/2\") 30G X 1/2\" 0.5 ML Misc Two types of insulin, inject 4x day 200 Each 1   • albuterol (PROVENTIL HFA) 108 (90 Base) MCG/ACT Aero Soln inhalation aerosol Inhale 2 Puffs by mouth every 6 hours as needed for Shortness of Breath. 8.5 g 2     No current facility-administered medications for this visit.        Social History   Substance Use Topics   • Smoking status: Current Every Day Smoker     Packs/day: 2.00     Years: 37.00     Types: Cigarettes   • Smokeless tobacco: Never Used      Comment: started smoking at age 22    • Alcohol use No       Past Medical History:   Diagnosis Date   • Anxiety    • Bipolar affective disorder ([facility])    • CHF (congestive heart failure) ([facility])    • CKD (chronic kidney disease) stage 3, GFR 30-59 ml/min ([facility])    • Colon polyp    • COPD (chronic obstructive pulmonary disease) ([facility])    • Depression    • Diabetes ([facility])    • Hyperlipidemia    • Kidney disease    • Muscle disorder        Family History   Problem Relation Age of Onset   • Heart Disease Mother    • Hypertension Mother    • Diabetes Father    • Cancer Maternal Grandfather    • Diabetes Paternal Grandmother    • Diabetes Paternal Grandfather    • Lung Disease Sister  " "      ROS:  Review of Systems   See HPI Above    Exam:  Pulse 92, temperature 36.1 °C (97 °F), temperature source Temporal, resp. rate 20, height 1.651 m (5' 5\"), weight 105.2 kg (232 lb), SpO2 93 %. Body mass index is 38.61 kg/m².    General:  Well nourished, well developed female in NAD  HENT:Head is grossly normal. PERRL.  Neck: Supple. Trachea is midline.  Pulmonary: Clear to ausculation .  Normal effort. No rales, ronchi, or wheezing.   Cardiovascular: Regular rate and rhythm.  Abdomen-Abdomen is soft, No tenderness.  Upper extremities- Strong = . Good ROM  Lower extremities- neg for edema, redness, tenderness.  Neuro- A & O x 4. Speech clear and appropriate.    Current medications, allergies, and problem list reviewed with patient and updated in [facility] today.    Assessment/Plan:  1. Stage 3 chronic kidney disease (HCC)  Continue w Nephrology, Avoid NSAID's  Work on better glucose control   2. Uncontrolled type 2 diabetes mellitus with kidney complication, with long-term current use of insulin ([facility])  Blood Glucose Monitoring Suppl (ONE TOUCH ULTRA SYSTEM KIT) w/Device Kit  Meds per Endocrine (Metfomin and Insulin)    ONE TOUCH LANCETS Misc    glucose blood (ONE TOUCH ULTRA TEST) strip   3. Vitamin D deficiency  ergocalciferol (DRISDOL) 19669 UNIT capsule   4. Mixed hyperlipidemia  Simvastatin RX. To continue Gemfibrozil.  Make appt to see Cardiology about this and CAD   5. Assistance needed with transportation  Patient interviewed. Chart reviewed and RTC paperwork completed- see media   6. Psychiatric disorder  Continue w [facility] and multiple Psychiatric Meds per [facility]   7. Chronic pain syndrome  cyclobenzaprine (FLEXERIL) 5 MG tablet in place of back ordered Robaxin  Make appt w Pain management       Return in about 3 weeks (around 10/31/2018) for lab results.  "

## 2018-10-10 NOTE — ASSESSMENT & PLAN NOTE
[facility]  PTSD, Bipolar, Anxiety, Psychosis/ADHD  Narcicisstic. Per report    States increased Buspar 15 mg x 2  TID. Hydroxyzine , Seroquel and Venlafaxine.    Reports just started them.  Denies suicidal ideation

## 2018-10-10 NOTE — ASSESSMENT & PLAN NOTE
Vitamin D level reviewed.  Discussed why optimal level of Vitamin D is important to health  Recommended Supplemental Vitamin D  She was started on weekly Vit D by Endocrine.

## 2018-10-10 NOTE — ASSESSMENT & PLAN NOTE
Pt has known hyperlipidemia  We reviewed her latest lipid panel showing   elev total, elev trigly and elev LDL  She reports she is taking Gemfibrozil 600 mg BID.   Has been on it 1 yr.  WE discussed importance of getting lipid level down  Recommend we add Statin- low dose and she is to reduce   Sugar and saturated fats.    I have recommended she make appt to see Cardiology within next 30 days.  Will start on low dose Simvastatin and have her continue Gemfibrozil for now.

## 2018-10-11 ENCOUNTER — PATIENT MESSAGE (OUTPATIENT)
Dept: MEDICAL GROUP | Facility: MEDICAL CENTER | Age: 58
End: 2018-10-11

## 2018-10-11 DIAGNOSIS — I10 ESSENTIAL HYPERTENSION: ICD-10-CM

## 2018-10-11 LAB
C TRACH DNA GENITAL QL NAA+PROBE: NEGATIVE
CYTOLOGY REG CYTOL: ABNORMAL
HPV HR 12 DNA CVX QL NAA+PROBE: POSITIVE
HPV16 DNA SPEC QL NAA+PROBE: NEGATIVE
HPV18 DNA SPEC QL NAA+PROBE: NEGATIVE
N GONORRHOEA DNA GENITAL QL NAA+PROBE: NEGATIVE
SPECIMEN SOURCE: ABNORMAL
SPECIMEN SOURCE: ABNORMAL

## 2018-10-13 DIAGNOSIS — G89.4 CHRONIC PAIN SYNDROME: ICD-10-CM

## 2018-10-13 DIAGNOSIS — F99 PSYCHIATRIC DISORDER: ICD-10-CM

## 2018-10-15 ENCOUNTER — PATIENT MESSAGE (OUTPATIENT)
Dept: MEDICAL GROUP | Facility: MEDICAL CENTER | Age: 58
End: 2018-10-15

## 2018-10-15 DIAGNOSIS — G89.29 OTHER CHRONIC PAIN: ICD-10-CM

## 2018-10-15 RX ORDER — PREGABALIN 200 MG/1
200 CAPSULE ORAL 2 TIMES DAILY
Qty: 60 CAP | Refills: 0 | Status: SHIPPED | OUTPATIENT
Start: 2018-10-15 | End: 2018-10-30 | Stop reason: SDUPTHER

## 2018-10-15 RX ORDER — VENLAFAXINE HYDROCHLORIDE 75 MG/1
CAPSULE, EXTENDED RELEASE ORAL
Qty: 30 CAP | Refills: 0 | Status: SHIPPED | OUTPATIENT
Start: 2018-10-15 | End: 2018-11-11 | Stop reason: SDUPTHER

## 2018-10-15 NOTE — PATIENT COMMUNICATION
[facility] SENT A FAX WITH A NOTICE:    DRUG INTERACTION:  SIMVASTATIN AND GEMFIBROZIL, SEVERE MYOPATHY, RHABDOMYOLYSIS AND ACUTE RENAL FAILURE.     CONTINUE TO FILL BOTH OR CANCEL ONE?

## 2018-10-15 NOTE — TELEPHONE ENCOUNTER
Prescription faxed to:    [facility]/pharmacy #[id number] - [place], [place] - [address]  [address] [address]  Phone: [phone] Fax: [phone]  .

## 2018-10-16 ENCOUNTER — APPOINTMENT (OUTPATIENT)
Dept: ENDOCRINOLOGY | Facility: MEDICAL CENTER | Age: 58
End: 2018-10-16
Payer: MEDICAID

## 2018-10-16 NOTE — PATIENT COMMUNICATION
Please let pharmacy know we will cancel the low dose Simvastatin  And Norma can continue on Gemfibrozil and will see Cardiology  For further tx.  Thanks  kaley

## 2018-10-16 NOTE — TELEPHONE ENCOUNTER
From: Norma Jaimes  To: MARIAH Haro  Sent: 10/15/2018 10:45 AM PDT  Subject: Prescription Question      I'm taking Gemfibrozil ,[facility] know if I take this med or the new one or both [facility] call [facility]    Can you fix the other [facility].thank you   p

## 2018-10-30 DIAGNOSIS — G89.29 OTHER CHRONIC PAIN: ICD-10-CM

## 2018-10-30 RX ORDER — PREGABALIN 200 MG/1
200 CAPSULE ORAL 2 TIMES DAILY
Qty: 60 CAP | Refills: 0 | Status: SHIPPED | OUTPATIENT
Start: 2018-10-30 | End: 2018-11-14 | Stop reason: SDUPTHER

## 2018-10-31 DIAGNOSIS — I10 ESSENTIAL HYPERTENSION: ICD-10-CM

## 2018-10-31 RX ORDER — FUROSEMIDE 40 MG/1
TABLET ORAL
Qty: 45 TAB | Refills: 0 | Status: SHIPPED | OUTPATIENT
Start: 2018-10-31 | End: 2018-11-14 | Stop reason: SDUPTHER

## 2018-11-06 DIAGNOSIS — M79.7 FIBROMYALGIA: ICD-10-CM

## 2018-11-06 DIAGNOSIS — K21.00 GASTROESOPHAGEAL REFLUX DISEASE WITH ESOPHAGITIS: ICD-10-CM

## 2018-11-06 DIAGNOSIS — I10 ESSENTIAL HYPERTENSION: ICD-10-CM

## 2018-11-06 RX ORDER — TOPIRAMATE 50 MG/1
TABLET, FILM COATED ORAL
Qty: 60 TAB | Refills: 0 | Status: SHIPPED | OUTPATIENT
Start: 2018-11-06 | End: 2018-11-14 | Stop reason: SDUPTHER

## 2018-11-06 RX ORDER — RANITIDINE 300 MG/1
TABLET ORAL
Qty: 60 TAB | Refills: 0 | Status: SHIPPED | OUTPATIENT
Start: 2018-11-06 | End: 2018-11-14 | Stop reason: SDUPTHER

## 2018-11-06 RX ORDER — POTASSIUM CHLORIDE 750 MG/1
TABLET, EXTENDED RELEASE ORAL
Qty: 60 TAB | Refills: 0 | Status: SHIPPED | OUTPATIENT
Start: 2018-11-06 | End: 2018-11-14 | Stop reason: SDUPTHER

## 2018-11-11 DIAGNOSIS — G89.4 CHRONIC PAIN SYNDROME: ICD-10-CM

## 2018-11-11 DIAGNOSIS — F99 PSYCHIATRIC DISORDER: ICD-10-CM

## 2018-11-12 RX ORDER — VENLAFAXINE HYDROCHLORIDE 75 MG/1
CAPSULE, EXTENDED RELEASE ORAL
Qty: 30 CAP | Refills: 0 | Status: SHIPPED | OUTPATIENT
Start: 2018-11-12 | End: 2018-11-14 | Stop reason: SDUPTHER

## 2018-11-14 ENCOUNTER — OFFICE VISIT (OUTPATIENT)
Dept: MEDICAL GROUP | Facility: MEDICAL CENTER | Age: 58
End: 2018-11-14
Attending: NURSE PRACTITIONER
Payer: MEDICAID

## 2018-11-14 VITALS
HEART RATE: 94 BPM | WEIGHT: 233 LBS | DIASTOLIC BLOOD PRESSURE: 62 MMHG | SYSTOLIC BLOOD PRESSURE: 102 MMHG | OXYGEN SATURATION: 95 % | TEMPERATURE: 97.8 F | RESPIRATION RATE: 20 BRPM | HEIGHT: 65 IN | BODY MASS INDEX: 38.82 KG/M2

## 2018-11-14 DIAGNOSIS — J44.9 CHRONIC OBSTRUCTIVE PULMONARY DISEASE, UNSPECIFIED COPD TYPE (HCC): ICD-10-CM

## 2018-11-14 DIAGNOSIS — K21.00 GASTROESOPHAGEAL REFLUX DISEASE WITH ESOPHAGITIS: ICD-10-CM

## 2018-11-14 DIAGNOSIS — K59.09 OTHER CONSTIPATION: ICD-10-CM

## 2018-11-14 DIAGNOSIS — G89.4 CHRONIC PAIN SYNDROME: ICD-10-CM

## 2018-11-14 DIAGNOSIS — M79.7 FIBROMYALGIA: ICD-10-CM

## 2018-11-14 DIAGNOSIS — R05.9 COUGH: ICD-10-CM

## 2018-11-14 DIAGNOSIS — N89.8 VAGINAL CYST: ICD-10-CM

## 2018-11-14 DIAGNOSIS — G40.909 SEIZURE DISORDER (HCC): ICD-10-CM

## 2018-11-14 DIAGNOSIS — F99 PSYCHIATRIC DISORDER: ICD-10-CM

## 2018-11-14 DIAGNOSIS — G89.29 OTHER CHRONIC PAIN: ICD-10-CM

## 2018-11-14 DIAGNOSIS — E55.9 VITAMIN D DEFICIENCY: ICD-10-CM

## 2018-11-14 DIAGNOSIS — E78.2 MIXED HYPERLIPIDEMIA: ICD-10-CM

## 2018-11-14 DIAGNOSIS — I25.10 CAD, MULTIPLE VESSEL: ICD-10-CM

## 2018-11-14 DIAGNOSIS — I10 ESSENTIAL HYPERTENSION: ICD-10-CM

## 2018-11-14 PROCEDURE — 99213 OFFICE O/P EST LOW 20 MIN: CPT | Performed by: NURSE PRACTITIONER

## 2018-11-14 PROCEDURE — 99214 OFFICE O/P EST MOD 30 MIN: CPT | Performed by: NURSE PRACTITIONER

## 2018-11-14 RX ORDER — VENLAFAXINE HYDROCHLORIDE 75 MG/1
75 CAPSULE, EXTENDED RELEASE ORAL
Qty: 30 CAP | Refills: 0 | Status: SHIPPED | OUTPATIENT
Start: 2018-11-14 | End: 2019-01-07

## 2018-11-14 RX ORDER — TOPIRAMATE 50 MG/1
50 TABLET, FILM COATED ORAL 2 TIMES DAILY
Qty: 60 TAB | Refills: 0 | Status: SHIPPED | OUTPATIENT
Start: 2018-11-14 | End: 2018-12-12 | Stop reason: SDUPTHER

## 2018-11-14 RX ORDER — RANITIDINE 300 MG/1
300 TABLET ORAL DAILY
Qty: 60 TAB | Refills: 0 | Status: SHIPPED | OUTPATIENT
Start: 2018-11-14 | End: 2018-12-27 | Stop reason: SDUPTHER

## 2018-11-14 RX ORDER — HYDROXYZINE HYDROCHLORIDE 10 MG/1
10 TABLET, FILM COATED ORAL 2 TIMES DAILY PRN
Qty: 60 TAB | Refills: 1 | Status: SHIPPED | OUTPATIENT
Start: 2018-11-14 | End: 2019-04-01 | Stop reason: SDUPTHER

## 2018-11-14 RX ORDER — CYCLOBENZAPRINE HCL 5 MG
5-10 TABLET ORAL 3 TIMES DAILY PRN
Qty: 30 TAB | Refills: 0 | Status: SHIPPED | OUTPATIENT
Start: 2018-11-14 | End: 2018-12-13

## 2018-11-14 RX ORDER — POTASSIUM CHLORIDE 750 MG/1
TABLET, EXTENDED RELEASE ORAL
Qty: 60 TAB | Refills: 0 | Status: SHIPPED | OUTPATIENT
Start: 2018-11-14 | End: 2018-12-27 | Stop reason: SDUPTHER

## 2018-11-14 RX ORDER — PREGABALIN 200 MG/1
200 CAPSULE ORAL 2 TIMES DAILY
Qty: 60 CAP | Refills: 0 | Status: SHIPPED | OUTPATIENT
Start: 2018-11-14 | End: 2018-11-30 | Stop reason: SDUPTHER

## 2018-11-14 RX ORDER — FUROSEMIDE 40 MG/1
40 TABLET ORAL
Qty: 45 TAB | Refills: 0 | Status: SHIPPED | OUTPATIENT
Start: 2018-11-14 | End: 2018-12-13 | Stop reason: SDUPTHER

## 2018-11-14 RX ORDER — LEVETIRACETAM 1000 MG/1
1500 TABLET ORAL 2 TIMES DAILY
Qty: 90 TAB | Refills: 0 | Status: SHIPPED | OUTPATIENT
Start: 2018-11-14 | End: 2018-12-22 | Stop reason: SDUPTHER

## 2018-11-14 NOTE — PROGRESS NOTES
"Chief Complaint: No chief complaint on file.    Epic Issues today, paper chart as well for today's  Visit.    HPI:  Norma is here today for a follow-up on chronic Pain, med refill    Her PMH includes:  Anxiety and Depression  Bipolar Disorder  Fibromyalgia  Seizures  HTN  MAGNOLIA  CAD  DM  Morbid Obesity  \"fibromyalgia\"  Chronic pain  Colon Polyps  Hiatal Hernia  Hyperlipidemia  Short term memory concerns  Kidney Disease  Tobacco abuse- smoking  Constipation  GERD  Cervical/Uterine Cancer w hysterectomy      Referrals Approved:  Cardiology, Nephrology, GYN, Sleep Study, Psychiatry, Psychology, GI  Podiatry, Ophthalmology, [place] [facility], Diabetic Education     [place]ev  Report:  8/30/18 Percocet 5/325 # 28 by Aung Degroot ( Pain Clinic)  8/22/18 Lyrica 200 mg # 60 by me  -----------------------------------------------------------------------------     Review of Records:  10/30/18 Telep Encounter regarding Psych Meds  To get from Psychiatry as discussed.    10/1/18 Tele. Encout--- Stop Statin, continue Gemfibrozil, to see Cardiology about lipids.    10/10/18 Clinic visit for RTC paperwork, F/u on pain.  Flexeril as Robaxin back ordered.  10/4/18 Endocrine C Lynn TInezE. GFR reduced, Patient to decrease Metformin  To 500 mg/day.  10/4/18 DR Ortiz Nephrology appt. Start on Vit D, Avoid NSAID's  10/1/18 T.E for Norma to obtain Psych meds from Psychiatry([facility]BA)  9/28/18 Labs-   CBC normal, CMP normal except BS= 170, A1C= 8.0, GFR= 49/41  Cholesterol Total= 264, Xvkscnb=413, HDL= 50, LDL=160,  TSH= 1.23  Vit B12 normal, Vit D= 9 (low)  C-pepitide= 6.2  9/27/18 Endocrine appt ludy ROMERO.  9/11/18 F/u on multiple concerns. REferred to Ophthalmology, Endocrine, Podiatry.  To continue w [facility] and also w her Cardiologist.  RX Buspar, Continue other Psych meds, To see Psychiatrist as planned on 9/25/18 and   RX Doxycycline and Bactroban.  9/11/18NO BLOOD WORK COMPLETED BY PATIENT   Upcoming appts\"  12/3/18 Pulmonary " "for Sleep Study  10/4/18 Nephrology r/t CKD  9/25 Psychiatry- Dr Khan  9/20 GYN  9/20/18 Cardiology r/t CAD, HTN.  8/29/18 [place] [facility] -1st appt  8/28/18 Mammogram normal  8/10/18 Fit Stool Test= Negative  8/8/18 Clinic for New Patient appt, Multiple concerns/issues. ---> Labs ordered. Fit Test Ordered,  ---->  At new patient Clinic Visit - Multiple Referrals placed:  GYN  Psychiatry  Psychology  Nephrology  Cardiology  Pain Clinic  Sleep STudy.  GI for hx of colon polyps       Vaginal cyst  Reports vaginal area cyst on right which is \"irritating\".  Denies itching or vaginal discharge. Hx of \"boils\" in past to area and groin.   Asking for antibiotic as this worked in past.  Denies fever or chills.    Cough  Reports cough and congestion on and off x 1 week.  Has slight SOB. Hx of tobacco use. Smoking.  Continues to smoke       Uncontrolled type 2 diabetes mellitus with kidney complication, with long-term current use of insulin ([facility])  Reports has not been taking her Insulin as her meter ( one Touch) she does not have test strips for.  Asking for Refills on \"all my medicines\".  Unable to do at time of this visit as [employer] is down and doing paper charting.  Will review meds and refill when Epic fully working.      Patient Active Problem List    Diagnosis Date Noted   • Vaginal cyst 11/14/2018   • Cough 11/14/2018   • Vitamin D deficiency 10/10/2018   • Assistance needed with transportation 10/10/2018   • Microalbuminuria due to type 2 diabetes mellitus ([facility]) 10/04/2018   • Neuropathy ([facility]) 09/27/2018   • Fatigue 09/27/2018   • Pain in both feet 09/11/2018   • Poor vision 09/11/2018   • Skin infection 09/11/2018   • Routine health maintenance 08/08/2018   • Uncontrolled type 2 diabetes mellitus with kidney complication, with long-term current use of insulin ([facility]) 08/08/2018   • Colon polyps 08/08/2018   • Mixed hyperlipidemia 08/08/2018   • Tobacco abuse 08/08/2018   • Psychiatric disorder 08/08/2018   • " Hot flashes 08/08/2018   • Other sleep apnea 08/08/2018   • Stage 3 chronic kidney disease (HCC) 08/08/2018   • CAD, multiple vessel 08/08/2018   • Chronic pain syndrome 08/08/2018       Allergies:Baclofen; Ees [erythromycin]; and Erythromycin [akne-mycin]    Medicines as of today:  Current Outpatient Prescriptions   Medication Sig Dispense Refill   • venlafaxine XR (EFFEXOR XR) 75 MG CAPSULE SR 24 HR Take 1 Cap by mouth every day. 30 Cap 0   • ranitidine (ZANTAC) 300 MG tablet Take 1 Tab by mouth every day. TAKE 1 TABLET BY MOUTH TWICE A DAY 60 Tab 0   • topiramate (TOPAMAX) 50 MG tablet Take 1 Tab by mouth 2 times a day. TAKE 1 TABLET BY MOUTH TWICE A DAY 60 Tab 0   • potassium chloride SA (K-DUR) 10 MEQ Tab CR TAKE 1 TABLET BY MOUTH TWICE A DAY 60 Tab 0   • furosemide (LASIX) 40 MG Tab Take 1 Tab by mouth every day. 45 Tab 0   • pregabalin (LYRICA) 200 MG capsule Take 1 Cap by mouth 2 times a day for 30 days. 60 Cap 0   • glucose blood (ONE TOUCH ULTRA TEST) strip 1 Strip by Other route 4 times a day. ONE TOUCH ULTRA  Strip 5   • metFORMIN (GLUCOPHAGE) 500 MG Tab Take 1 Tab by mouth 2 times a day, with meals. 180 Tab 0   • levetiracetam (KEPPRA) 1000 MG tablet Take 1.5 Tabs by mouth 2 Times a Day. 90 Tab 0   • hydrOXYzine HCl (ATARAX) 10 MG Tab Take 1 Tab by mouth 2 times a day as needed for Itching. 60 Tab 1   • cyclobenzaprine (FLEXERIL) 5 MG tablet Take 1-2 Tabs by mouth 3 times a day as needed. 30 Tab 0   • Blood Glucose Monitoring Suppl (ONE TOUCH ULTRA SYSTEM KIT) w/Device Kit 1 glucometer for use w DM-2 , ON metformin and Insulin Check 4/day 1 Kit 0   • metoprolol (LOPRESSOR) 25 MG Tab Take 1 Tab by mouth every day. 90 Tab 0   • ergocalciferol (DRISDOL) 74314 UNIT capsule Take 1 Cap by mouth every 7 days. 12 Cap 2   • simvastatin (ZOCOR) 10 MG Tab Take 1 Tab by mouth every evening. 30 Tab 2   • ONE TOUCH LANCETS Misc Test bld sugar 4x per day w lancets 200 Each 2   • AGGRENOX  MG CAPSULE SR 12  "HR TAKE 1 CAPSULE BY MOUTH TWICE A DAY 60 Cap 1   • busPIRone (BUSPAR) 15 MG tablet Take 0.5 Tabs by mouth 3 times a day. 90 Tab 0   • mupirocin (BACTROBAN) 2 % Ointment Apply to skin infection twice daily 1 Tube 2   • Cranberry 1000 MG Cap Take 1,000 mg by mouth every day.     • polyethylene glycol/lytes (MIRALAX) Pack Take 1 Packet by mouth every day. 1 Each 1   • atorvastatin (LIPITOR) 40 MG Tab Take 1 Tab by mouth every day. 30 Tab 1   • Insulin Syringe-Needle U-100 (INSULIN SYRINGE .5CC/30GX1/2\") 30G X 1/2\" 0.5 ML Misc Two types of insulin, inject 4x day 200 Each 1   • albuterol (PROVENTIL HFA) 108 (90 Base) MCG/ACT Aero Soln inhalation aerosol Inhale 2 Puffs by mouth every 6 hours as needed for Shortness of Breath. 8.5 g 2   • Lancet Devices Misc 1 Applicator by Does not apply route 3 times a day. (Patient not taking: Reported on 11/14/2018) 100 Applicator 11   • Blood Glucose Monitoring Suppl Supplies Misc One Touch Verio Flex meter 1 Each 0   • Blood Glucose Monitoring Suppl Supplies Misc One Touch Verio test strips testing 3 times daily testing for insulin adjustment. 100 Strip 11   • oxyCODONE-acetaminophen (ROXICET) 5-325 MG/5ML Solution Take 5 mL by mouth every four hours as needed.     • mirtazapine (REMERON) 45 MG tablet TAKE 1 TAB BY MOUTH 1 TIME DAILY AS NEEDED. 30 Tab 0   • gemfibrozil (LOPID) 600 MG Tab TAKE 1 TAB BY MOUTH TWICE DAILY 60 Tab 0   • insulin glargine (LANTUS) 100 UNIT/ML Solution Inject 80 Units as instructed at bedtime as needed. (Patient not taking: Reported on 11/14/2018) 60 mL 0   • doxycycline (MONODOX) 100 MG capsule Take 1 Cap by mouth 2 times a day. (Patient not taking: Reported on 11/14/2018) 14 Cap 0   • nitroglycerin (NITRODUR) 0.2 MG/HR PATCH 24 HR Apply 1 Patch to skin as directed every day.     • Dexlansoprazole (DEXILANT) 60 MG CAPSULE DELAYED RELEASE delayed-release capsule Take 60 mg by mouth every day. (Patient not taking: Reported on 11/14/2018) 30 Cap 2   • " Plecanatide (TRULANCE) 3 MG Tab Take 3 mg by mouth every day. 30 Tab 1   • quetiapine (SEROQUEL) 400 MG tablet Take 2 Tabs by mouth every bedtime. 60 Tab 1   • insulin lispro (HUMALOG) 100 UNIT/ML Solution Inject 40 Units as instructed 3 times a day before meals. (Patient not taking: Reported on 11/14/2018) 20 mL 1     No current facility-administered medications for this visit.        Social History   Substance Use Topics   • Smoking status: Current Every Day Smoker     Packs/day: 2.00     Years: 37.00     Types: Cigarettes   • Smokeless tobacco: Never Used      Comment: started smoking at age 22    • Alcohol use No       Past Medical History:   Diagnosis Date   • Anxiety    • Bipolar affective disorder (HCC)    • CHF (congestive heart failure) ([facility])    • CKD (chronic kidney disease) stage 3, GFR 30-59 ml/min (HCC)    • Colon polyp    • COPD (chronic obstructive pulmonary disease) ([facility])    • Depression    • Diabetes ([facility])    • Hyperlipidemia    • Kidney disease    • Muscle disorder        Family History   Problem Relation Age of Onset   • Heart Disease Mother    • Hypertension Mother    • Diabetes Father    • Cancer Maternal Grandfather    • Diabetes Paternal Grandmother    • Diabetes Paternal Grandfather    • Lung Disease Sister        ROS:  Review of Systems   See HPI Above    Exam:  There were no vitals taken for this visit. There is no height or weight on file to calculate BMI.    General:  Well nourished, well developed female in NAD  HENT:Head is grossly normal. PERRL.  Neck: Supple. Trachea is midline.  Pulmonary: Clear to ausculation .  Normal effort. No rales, ronchi, or wheezing.   Cardiovascular: Regular rate and rhythm.  Abdomen-Abdomen is soft, No tenderness.  Upper extremities- Strong = . Good ROM  Lower extremities- neg for edema, redness, tenderness.  Neuro- A & O x 4. Speech clear and appropriate.    Current medications, allergies, and problem list reviewed with patient and updated in [facility]  today.    Assessment/Plan:  1. Psychiatric disorder  venlafaxine XR (EFFEXOR XR) 75 MG CAPSULE SR 24 HR    hydrOXYzine HCl (ATARAX) 10 MG Tab   2. Chronic pain syndrome  venlafaxine XR (EFFEXOR XR) 75 MG CAPSULE SR 24 HR    cyclobenzaprine (FLEXERIL) 5 MG tablet   3. Gastroesophageal reflux disease with esophagitis  ranitidine (ZANTAC) 300 MG tablet   4. Fibromyalgia  topiramate (TOPAMAX) 50 MG tablet   5. Essential hypertension  potassium chloride SA (K-DUR) 10 MEQ Tab CR    furosemide (LASIX) 40 MG Tab   6. Other chronic pain  pregabalin (LYRICA) 200 MG capsule   7. Uncontrolled type 2 diabetes mellitus with kidney complication, with long-term current use of insulin ([facility])  glucose blood (ONE TOUCH ULTRA TEST) strip   8. Uncontrolled type 2 diabetes mellitus without complication, without long-term current use of insulin ([facility])  metFORMIN (GLUCOPHAGE) 500 MG Tab   9. Seizure disorder (HCC)  levetiracetam (KEPPRA) 1000 MG tablet                       10. Chronic obstructive pulmonary disease, unspecified COPD type ([facility])  Continue Inhalers.   11. Vaginal cyst  Doxycycline   12. Cough  Doxycycline, Robitussin w codeine.       Return in about 4 weeks (around 12/12/2018).

## 2018-11-14 NOTE — ASSESSMENT & PLAN NOTE
"Reports has not been taking her Insulin as her meter ( one Touch) she does not have test strips for.  Asking for Refills on \"all my medicines\".  Unable to do at time of this visit as [facility] is down and doing paper charting.  Will review meds and refill when Epic fully working.  "

## 2018-11-14 NOTE — ASSESSMENT & PLAN NOTE
"Reports vaginal area cyst on right which is \"irritating\".  Denies itching or vaginal discharge. Hx of \"boils\" in past to area and groin.   Asking for antibiotic as this worked in past.  Denies fever or chills.  "

## 2018-11-14 NOTE — ASSESSMENT & PLAN NOTE
Reports cough and congestion on and off x 1 week.  Has slight SOB. Hx of tobacco use. Smoking.  Continues to smoke

## 2018-11-28 RX ORDER — INSULIN GLARGINE 100 [IU]/ML
80 INJECTION, SOLUTION SUBCUTANEOUS NIGHTLY PRN
Qty: 20 ML | Refills: 1 | Status: SHIPPED | OUTPATIENT
Start: 2018-11-28 | End: 2019-01-07 | Stop reason: SDUPTHER

## 2018-11-30 DIAGNOSIS — G89.29 OTHER CHRONIC PAIN: ICD-10-CM

## 2018-12-03 ENCOUNTER — SLEEP CENTER VISIT (OUTPATIENT)
Dept: SLEEP MEDICINE | Facility: MEDICAL CENTER | Age: 58
End: 2018-12-03
Payer: MEDICAID

## 2018-12-03 VITALS
DIASTOLIC BLOOD PRESSURE: 62 MMHG | SYSTOLIC BLOOD PRESSURE: 112 MMHG | HEART RATE: 75 BPM | OXYGEN SATURATION: 96 % | WEIGHT: 234 LBS | HEIGHT: 65 IN | BODY MASS INDEX: 38.99 KG/M2 | RESPIRATION RATE: 20 BRPM

## 2018-12-03 DIAGNOSIS — G47.10 HYPERSOMNOLENCE: ICD-10-CM

## 2018-12-03 DIAGNOSIS — G47.33 OBSTRUCTIVE SLEEP APNEA SYNDROME: ICD-10-CM

## 2018-12-03 DIAGNOSIS — F17.200 SMOKER: ICD-10-CM

## 2018-12-03 PROCEDURE — 99204 OFFICE O/P NEW MOD 45 MIN: CPT | Performed by: INTERNAL MEDICINE

## 2018-12-10 NOTE — PROGRESS NOTES
CC:  Establish care for sleep apnea-hypopnea syndrome.    HPI:   Ms. Jaimes is a 58-year-old woman referred by THUY Mariscal, to assist in evaluation and management of known sleep-disordered breathing.    In 2005 she was evaluated in [place] for snoring and excessive daytime somnolence.  A sleep test 9n 2011 demonstrated an apnea hypopnea index of 57 events per hour with a lowest arterial oxygen saturation of 81% on room air.  A more recent polysomnogram on January 21, 2016 demonstrated an apnea hypopnea index of 15.9 events per hour with no REM sleep time recorded.  The lowest arterial oxygen saturation was 83% on room air.  In a titration polysomnogram on February 4, 2016 she did well on CPAP at 12 cm water where the apnea hypopnea index fell to 1 event per hour with a lowest arterial oxygen saturation of 89% on room air.    She continues to use the CPAP each night, throughout the night.  She is not having unusual problems mask fit, leakage or airway dryness using a medium Lucía view fullface mask.  She enjoys reasonable levels of daytime alertness and is not napping or falling asleep inappropriately.  She is not yet completed the [place] sleepiness score.  She drinks caffeinated beverages rarely and does not use substances to induce sleep or to maintain wakefulness.  She does not have symptoms suggesting narcolepsy, parasomnia or restless leg syndrome.    The CPAP data recording chip information is reviewed with the patient.  It demonstrates use on each of the last 30 days with an average daily usage of 9 hours and 53 minutes.  Leak is moderate and the estimated residual apnea hypopnea index is 3.3 events per hour.        Patient Active Problem List    Diagnosis Date Noted   • Vaginal cyst 11/14/2018   • Cough 11/14/2018   • Vitamin D deficiency 10/10/2018   • Assistance needed with transportation 10/10/2018   • Microalbuminuria due to type 2 diabetes mellitus (HCC) 10/04/2018   • Neuropathy ([facility])  09/27/2018   • Fatigue 09/27/2018   • Pain in both feet 09/11/2018   • Poor vision 09/11/2018   • Skin infection 09/11/2018   • Routine health maintenance 08/08/2018   • Uncontrolled type 2 diabetes mellitus with kidney complication, with long-term current use of insulin (HCC) 08/08/2018   • Colon polyps 08/08/2018   • Mixed hyperlipidemia 08/08/2018   • Tobacco abuse 08/08/2018   • Psychiatric disorder 08/08/2018   • Hot flashes 08/08/2018   • Other sleep apnea 08/08/2018   • Stage 3 chronic kidney disease (HCC) 08/08/2018   • CAD, multiple vessel 08/08/2018   • Chronic pain syndrome 08/08/2018       Past Medical History:   Diagnosis Date   • Anxiety    • Asthma    • Back pain    • Bipolar affective disorder ([facility])    • Bronchitis    • CHF (congestive heart failure) ([facility])    • Chickenpox    • CKD (chronic kidney disease) stage 3, GFR 30-59 ml/min ([facility])    • Colon polyp    • COPD (chronic obstructive pulmonary disease) ([facility])    • Depression    • Diabetes ([facility])    • Heart attack ([facility])    • Hyperlipidemia    • Kidney disease    • Kidney stone    • Muscle disorder    • Obesity    • Pneumonia    • Pulmonary embolism ([facility])    • Seizure ([facility])    • Sleep apnea        Past Surgical History:   Procedure Laterality Date   • ABDOMINAL HYSTERECTOMY TOTAL     • APPENDECTOMY     • CARPAL TUNNEL RELEASE     • COLONOSCOPY      history of colon    • HYSTERECTOMY LAPAROSCOPY     • INTUBATION     • SINUSCOPE     • VENTILATOR CONTINUOUS         Family History   Problem Relation Age of Onset   • Heart Disease Mother    • Hypertension Mother    • Diabetes Father    • Cancer Maternal Grandfather    • Diabetes Paternal Grandmother    • Diabetes Paternal Grandfather    • Lung Disease Sister    • Cancer Son        Social History     Social History   • Marital status:      Spouse name: N/A   • Number of children: N/A   • Years of education: N/A     Occupational History   • Not on file.     Social History Main Topics   • Smoking status:  Current Every Day Smoker     Packs/day: 2.00     Years: 37.00     Types: Cigarettes   • Smokeless tobacco: Never Used      Comment: started smoking at age 22    • Alcohol use No   • Drug use: No   • Sexual activity: Not Currently     Other Topics Concern   • Not on file     Social History Narrative   • No narrative on file       Current Outpatient Prescriptions   Medication Sig Dispense Refill   • LYRICA 200 MG capsule TAKE 1 CAP BY MOUTH TWICE A DAY FOR 30 DAYS 60 Cap 0   • methocarbamol (ROBAXIN) 500 MG Tab TAKE 2 TABLETS BY MOUTH 3 TIMES A  Tab 1   • potassium chloride SA (K-DUR) 10 MEQ Tab CR TAKE 1 TABLET BY MOUTH TWICE A DAY 60 Tab 0   • furosemide (LASIX) 40 MG Tab Take 1 Tab by mouth every day. 45 Tab 0   • metFORMIN (GLUCOPHAGE) 500 MG Tab Take 1 Tab by mouth 2 times a day, with meals. 180 Tab 0   • levetiracetam (KEPPRA) 1000 MG tablet Take 1.5 Tabs by mouth 2 Times a Day. 90 Tab 0   • busPIRone (BUSPAR) 15 MG tablet Take 0.5 Tabs by mouth 3 times a day. 90 Tab 0   • albuterol (PROVENTIL HFA) 108 (90 Base) MCG/ACT Aero Soln inhalation aerosol Inhale 2 Puffs by mouth every 6 hours as needed for Shortness of Breath. 8.5 g 2   • LANTUS 100 UNIT/ML Solution INJECT 80 UNITS AS INSTRUCTED AT BEDTIME AS NEEDED. 60 mL 2   • LANTUS 100 UNIT/ML Solution INJECT 80 UNITS AS INSTRUCTED AT BEDTIME AS NEEDED. 20 mL 1   • venlafaxine XR (EFFEXOR XR) 75 MG CAPSULE SR 24 HR Take 1 Cap by mouth every day. 30 Cap 0   • ranitidine (ZANTAC) 300 MG tablet Take 1 Tab by mouth every day. TAKE 1 TABLET BY MOUTH TWICE A DAY 60 Tab 0   • topiramate (TOPAMAX) 50 MG tablet Take 1 Tab by mouth 2 times a day. TAKE 1 TABLET BY MOUTH TWICE A DAY 60 Tab 0   • glucose blood (ONE TOUCH ULTRA TEST) strip 1 Strip by Other route 4 times a day. ONE TOUCH ULTRA  Strip 5   • hydrOXYzine HCl (ATARAX) 10 MG Tab Take 1 Tab by mouth 2 times a day as needed for Itching. 60 Tab 1   • cyclobenzaprine (FLEXERIL) 5 MG tablet Take 1-2 Tabs by  mouth 3 times a day as needed. 30 Tab 0   • Blood Glucose Monitoring Suppl (ONE TOUCH ULTRA SYSTEM KIT) w/Device Kit 1 glucometer for use w DM-2 , ON metformin and Insulin Check 4/day 1 Kit 0   • metoprolol (LOPRESSOR) 25 MG Tab Take 1 Tab by mouth every day. 90 Tab 0   • ergocalciferol (DRISDOL) 71915 UNIT capsule Take 1 Cap by mouth every 7 days. 12 Cap 2   • simvastatin (ZOCOR) 10 MG Tab Take 1 Tab by mouth every evening. 30 Tab 2   • ONE TOUCH LANCETS Misc Test bld sugar 4x per day w lancets 200 Each 2   • AGGRENOX  MG CAPSULE SR 12 HR TAKE 1 CAPSULE BY MOUTH TWICE A DAY 60 Cap 1   • Lancet Devices Misc 1 Applicator by Does not apply route 3 times a day. (Patient not taking: Reported on 11/14/2018) 100 Applicator 11   • Blood Glucose Monitoring Suppl Supplies Misc One Touch Verio Flex meter 1 Each 0   • Blood Glucose Monitoring Suppl Supplies Misc One Touch Verio test strips testing 3 times daily testing for insulin adjustment. 100 Strip 11   • oxyCODONE-acetaminophen (ROXICET) 5-325 MG/5ML Solution Take 5 mL by mouth every four hours as needed.     • mirtazapine (REMERON) 45 MG tablet TAKE 1 TAB BY MOUTH 1 TIME DAILY AS NEEDED. 30 Tab 0   • gemfibrozil (LOPID) 600 MG Tab TAKE 1 TAB BY MOUTH TWICE DAILY 60 Tab 0   • doxycycline (MONODOX) 100 MG capsule Take 1 Cap by mouth 2 times a day. (Patient not taking: Reported on 11/14/2018) 14 Cap 0   • mupirocin (BACTROBAN) 2 % Ointment Apply to skin infection twice daily 1 Tube 2   • Cranberry 1000 MG Cap Take 1,000 mg by mouth every day.     • nitroglycerin (NITRODUR) 0.2 MG/HR PATCH 24 HR Apply 1 Patch to skin as directed every day.     • Dexlansoprazole (DEXILANT) 60 MG CAPSULE DELAYED RELEASE delayed-release capsule Take 60 mg by mouth every day. (Patient not taking: Reported on 11/14/2018) 30 Cap 2   • Plecanatide (TRULANCE) 3 MG Tab Take 3 mg by mouth every day. 30 Tab 1   • polyethylene glycol/lytes (MIRALAX) Pack Take 1 Packet by mouth every day. 1 Each 1  "  • quetiapine (SEROQUEL) 400 MG tablet Take 2 Tabs by mouth every bedtime. 60 Tab 1   • atorvastatin (LIPITOR) 40 MG Tab Take 1 Tab by mouth every day. (Patient not taking: Reported on 12/3/2018) 30 Tab 1   • insulin lispro (HUMALOG) 100 UNIT/ML Solution Inject 40 Units as instructed 3 times a day before meals. (Patient not taking: Reported on 11/14/2018) 20 mL 1   • Insulin Syringe-Needle U-100 (INSULIN SYRINGE .5CC/30GX1/2\") 30G X 1/2\" 0.5 ML Misc Two types of insulin, inject 4x day 200 Each 1     No current facility-administered medications for this visit.     \"CURRENT RX\"      Allergies: Baclofen; Ees [erythromycin]; and Erythromycin [akne-mycin]      ROS  Positive for the sleep, endocrine, neurologic and GI issues reviewed above.  She wears corrective lenses but has no diplopia.  She denies episodic rhinitis, tinnitus and hoarseness as well as some dental problems.  She has dependent symmetrical ankle edema without a history of deep venous thrombosis of known congestive heart failure but she does have some orthopnea.  He has stable exertional dyspnea but no regular productive cough.  She has some heartburn symptoms without melena or hematochezia.  She has back pain and arthralgias as well as frequent headaches.  All other aspects of the CPT review of systems process are negative as outlined in the attached self history form.      Physical Exam:   /62 (BP Location: Left arm, Patient Position: Sitting, BP Cuff Size: Large adult)   Pulse 75   Resp 20   Ht 1.651 m (5' 5\")   Wt 106.1 kg (234 lb)   SpO2 96%   BMI 38.94 kg/m²    Head and neck examination demonstrates no mucosal lesion, purulent drainage or evident polyps. The pharynx is benign with a Mallampati III presentation. The neck is supple without thyromegaly. On chest examination there are symmetrical bilateral breath sounds without rales, wheezing or consolidation. On cardiac examination, the apical impulse and heart sounds are normal and the " rhythm is regular. There is no murmur, gallop or rub and no jugular venous distention. The abdomen is soft with active bowel sounds and no palpable hepatosplenomegaly, mass, guarding or rebound. The extremities show no clubbing, cyanosis or edema and no signs of deep venous thrombosis. There is no warmth, redness, tenderness or palpable venous cord in the calves. The skin is clear, warm and dry. There is no unusual peripheral lymphadenopathy. Peripheral pulses are palpable in all 4 extremities. On neurologic examination, cranial nerve function is intact, motor tone is symmetrical, and the patient is alert, oriented and responsive.       Problems:  1. Obstructive sleep apnea syndrome  She has moderate to severe sleep apnea hypopnea, as documented in several prior polysomnograms.  She has done well on CPAP therapy.  She is using the machine each night, throughout the night, as documented by the data recording chip.  She enjoys reasonable levels of daytime alertness and is not napping or falling asleep inappropriately.  Continue therapy is required in only to maintain levels of daytime alertness but also to reduce the cardiac and neurologic risks associated with untreated sleep apnea hypopnea.    2. Hypersomnolence  Improved with treatment for sleep disordered breathing.    3. BMI 39.0-39.9,adult  We have discussed the role weight management in the treatment of her sleep-disordered breathing and the ways in which that might be accomplished.    4. Smoker  She is encouraged to discontinue smoking completely and permanently and we talked about the ways in which that might be accomplished.      Plan:   Continue CPAP at 12 cm water pressure.  Her machine is now at least 7 years old and probably needs replacement.    Return visit here in 2-3 months, or sooner if new problems develop.    We appreciate the opportunity to assist in her care.

## 2018-12-12 DIAGNOSIS — M79.7 FIBROMYALGIA: ICD-10-CM

## 2018-12-12 DIAGNOSIS — I25.10 CAD, MULTIPLE VESSEL: ICD-10-CM

## 2018-12-12 RX ORDER — TOPIRAMATE 50 MG/1
50 TABLET, FILM COATED ORAL 2 TIMES DAILY
Qty: 60 TAB | Refills: 0 | Status: SHIPPED | OUTPATIENT
Start: 2018-12-12 | End: 2019-01-07 | Stop reason: SDUPTHER

## 2018-12-12 RX ORDER — ASPIRIN/DIPYRIDAMOLE 25MG-200MG
CAPSULE,EXTENDED RELEASE MULTIPHASE 12HR ORAL
Qty: 60 CAP | Refills: 0 | Status: SHIPPED | OUTPATIENT
Start: 2018-12-12 | End: 2019-01-07 | Stop reason: SDUPTHER

## 2018-12-13 ENCOUNTER — OFFICE VISIT (OUTPATIENT)
Dept: MEDICAL GROUP | Facility: MEDICAL CENTER | Age: 58
End: 2018-12-13
Attending: FAMILY MEDICINE
Payer: MEDICAID

## 2018-12-13 VITALS
RESPIRATION RATE: 16 BRPM | OXYGEN SATURATION: 98 % | TEMPERATURE: 96.5 F | WEIGHT: 230 LBS | HEIGHT: 65 IN | BODY MASS INDEX: 38.32 KG/M2 | HEART RATE: 80 BPM | DIASTOLIC BLOOD PRESSURE: 70 MMHG | SYSTOLIC BLOOD PRESSURE: 112 MMHG

## 2018-12-13 DIAGNOSIS — I10 ESSENTIAL HYPERTENSION: ICD-10-CM

## 2018-12-13 DIAGNOSIS — M54.2 NECK PAIN: ICD-10-CM

## 2018-12-13 DIAGNOSIS — G89.4 CHRONIC PAIN SYNDROME: ICD-10-CM

## 2018-12-13 PROCEDURE — 99213 OFFICE O/P EST LOW 20 MIN: CPT | Performed by: FAMILY MEDICINE

## 2018-12-13 PROCEDURE — 99214 OFFICE O/P EST MOD 30 MIN: CPT | Performed by: FAMILY MEDICINE

## 2018-12-13 RX ORDER — FUROSEMIDE 40 MG/1
40 TABLET ORAL
Qty: 45 TAB | Refills: 0 | Status: SHIPPED | OUTPATIENT
Start: 2018-12-13 | End: 2019-01-07 | Stop reason: SDUPTHER

## 2018-12-13 RX ORDER — TIZANIDINE 4 MG/1
4 TABLET ORAL EVERY 6 HOURS PRN
Qty: 30 TAB | Refills: 0 | Status: SHIPPED | OUTPATIENT
Start: 2018-12-13 | End: 2019-01-07 | Stop reason: SDUPTHER

## 2018-12-13 ASSESSMENT — ENCOUNTER SYMPTOMS
FOCAL WEAKNESS: 0
PHOTOPHOBIA: 0
TINGLING: 1
COUGH: 0
FEVER: 0
WEAKNESS: 0
NAUSEA: 0
PALPITATIONS: 0
SPUTUM PRODUCTION: 0
ABDOMINAL PAIN: 0
SPEECH CHANGE: 0
PSYCHIATRIC NEGATIVE: 1
WEIGHT LOSS: 0
DOUBLE VISION: 0
NUMBNESS: 0
PARESIS: 0
SYNCOPE: 0
VISUAL CHANGE: 0
SHORTNESS OF BREATH: 0
NECK PAIN: 1
TROUBLE SWALLOWING: 0
VOMITING: 0
LEG PAIN: 1
HEADACHES: 0
BLURRED VISION: 0
TREMORS: 0
SENSORY CHANGE: 0
CHILLS: 0

## 2018-12-13 NOTE — PROGRESS NOTES
"Subjective:      Norma Jaimes is a 58 y.o. female who presents with Neck Pain and Leg Pain            Neck Pain    This is a recurrent problem. The current episode started more than 1 month ago. The problem occurs constantly. The problem has been gradually worsening. The pain is associated with an unknown factor. The pain is present in the midline, right side and left side. The quality of the pain is described as cramping and aching. The pain is moderate. The symptoms are aggravated by position and twisting. Associated symptoms include leg pain and tingling. Pertinent negatives include no chest pain, fever, headaches, numbness, pain with swallowing, paresis, photophobia, syncope, trouble swallowing, visual change, weakness or weight loss. She has tried muscle relaxants, NSAIDs and oral narcotics (We will order an x-ray of her neck, and refer to physical therapy as well as pain management.  She will also continue to use her medications as previously directed.  ER precautions also given to patient today.) for the symptoms.       Review of Systems   Constitutional: Negative for chills, fever and weight loss.   HENT: Negative for hearing loss, tinnitus and trouble swallowing.    Eyes: Negative for blurred vision, double vision and photophobia.   Respiratory: Negative for cough, sputum production and shortness of breath.    Cardiovascular: Negative for chest pain, palpitations and syncope.   Gastrointestinal: Negative for abdominal pain, nausea and vomiting.   Musculoskeletal: Positive for joint pain and neck pain.   Neurological: Positive for tingling. Negative for tremors, sensory change, speech change, focal weakness, weakness, numbness and headaches.   Psychiatric/Behavioral: Negative.           Objective:     /70 (BP Location: Left arm, Patient Position: Sitting)   Pulse 80   Temp 35.8 °C (96.5 °F)   Resp 16   Ht 1.651 m (5' 5\")   Wt 104.3 kg (230 lb)   SpO2 98%   BMI 38.27 kg/m²  "     Physical Exam   Constitutional: She is oriented to person, place, and time.   BMI 38   HENT:   Head: Normocephalic and atraumatic.   Cardiovascular: Normal rate, regular rhythm and normal heart sounds.  Exam reveals no friction rub.    No murmur heard.  Pulmonary/Chest: Effort normal and breath sounds normal. No respiratory distress. She has no wheezes. She has no rales.   Abdominal: Soft. Bowel sounds are normal. She exhibits no distension. There is no tenderness.   Musculoskeletal: She exhibits tenderness. She exhibits no edema.   Decreased range of motion of neck in flexion.   Neurological: She is alert and oriented to person, place, and time.   Skin: Skin is warm and dry.   Psychiatric: She has a normal mood and affect. Her behavior is normal.   Nursing note and vitals reviewed.              Assessment/Plan:     1. Essential hypertension  We will have her continue to use her medication as previously directed.  Refill will be sent to her pharmacy to continue using as directed. She has been advised to monitor blood pressure at home and keep notes. If blood pressure elevated or having symptoms of CP, SOB or neurologic changes to go to the er.    - furosemide (LASIX) 40 MG Tab; Take 1 Tab by mouth every day.  Dispense: 45 Tab; Refill: 0    2. Chronic pain syndrome  A referral to pain management and physical therapy will be made today.  An x-ray of her neck will also be obtained.  She will continue to use her medications as previously directed until she is able to reestablish with pain management.  We will continue to follow.  - REFERRAL TO PAIN CLINIC    3. Neck pain  See above plan.  - REFERRAL TO PAIN CLINIC

## 2018-12-22 DIAGNOSIS — G40.909 SEIZURE DISORDER (HCC): ICD-10-CM

## 2018-12-24 RX ORDER — LEVETIRACETAM 1000 MG/1
1500 TABLET ORAL 2 TIMES DAILY
Qty: 90 TAB | Refills: 0 | Status: SHIPPED | OUTPATIENT
Start: 2018-12-24 | End: 2019-01-07 | Stop reason: SDUPTHER

## 2018-12-26 ENCOUNTER — TELEPHONE (OUTPATIENT)
Dept: CARDIOLOGY | Facility: MEDICAL CENTER | Age: 58
End: 2018-12-26

## 2018-12-26 NOTE — TELEPHONE ENCOUNTER
Spoke with patient about her upcoming appointment with [facility]. Patient saw a cardiologist when she lived in [place] Dr. Sutherland  Phone- [phone] F: [phone]. Sent request for previous records. Patients most recent labs from August 2018 and no recent EKG. Patient confirmed appointment with [facility] 12-

## 2018-12-27 ENCOUNTER — PHYSICAL THERAPY (OUTPATIENT)
Dept: PHYSICAL THERAPY | Facility: REHABILITATION | Age: 58
End: 2018-12-27
Attending: FAMILY MEDICINE
Payer: MEDICAID

## 2018-12-27 DIAGNOSIS — G89.4 CHRONIC PAIN SYNDROME: ICD-10-CM

## 2018-12-27 DIAGNOSIS — M54.2 NECK PAIN: ICD-10-CM

## 2018-12-27 PROCEDURE — 97162 PT EVAL MOD COMPLEX 30 MIN: CPT

## 2018-12-27 PROCEDURE — 97112 NEUROMUSCULAR REEDUCATION: CPT

## 2018-12-27 SDOH — ECONOMIC STABILITY: GENERAL: QUALITY OF LIFE: POOR

## 2018-12-27 ASSESSMENT — ENCOUNTER SYMPTOMS
ALLEVIATING FACTORS: POSITION CHANGE
EXACERBATED BY: WALKING
PAIN TIMING: CONSTANT
QUALITY: KNIFE-LIKE
QUALITY: STABBING
PAIN SCALE: 7
PAIN SCALE AT HIGHEST: 10
PAIN SCALE AT LOWEST: 7
EXACERBATED BY: MOVEMENT
QUALITY: HOT
QUALITY: TINGLING
EXACERBATED BY: STANDING

## 2018-12-27 ASSESSMENT — ACTIVITIES OF DAILY LIVING (ADL): AMBULATION_WITH_ASSISTIVE_DEVICE: INDEPENDENT

## 2018-12-27 NOTE — OP THERAPY EVALUATION
"  Outpatient Physical Therapy  INITIAL EVALUATION    [facility]  [address]  [address]  [facility] [address]  Phone:  [phone]  Fax:  [phone]    Date of Evaluation: 12/27/2018    Patient: Norma Jaimes  YOB: 1960  MRN: [id number]     Referring Provider: Kenneth Arreola M.D.  [address]  [place] [address]   Referring Diagnosis Chronic pain syndrome [G89.4];Neck pain [M54.2]     Time Calculation    1033  1125  52 min     Physical Therapy Occurrence Codes    Date of onset of impairment:  12/27/1998   Date physical therapy care plan established or reviewed:  12/27/18   Date physical therapy treatment started:  12/27/18          Chief Complaint: Neck Pain    Visit Diagnoses     ICD-10-CM   1. Chronic pain syndrome G89.4   2. Neck pain M54.2         Subjective:   History of Present Illness:     Date of onset:  12/27/1998    Mechanism of injury:  Ran over by [employer]scar 20 years ago.  Marceloller fell downhill on top of her and she fell and rolled down the hill.  Caused neck and low back pain.  Tried chiropractic treatment immediately following accident, but didn't help.    About 7-10 years ago, B shoulders and B knees started to hurt and are very sensitive to getting \"bumped\".  R wrist hurts for 3 weeks now due to dog leash getting wrapped around it and pulled.  Usually uses cane in R hand to address L hip pain.  Now using cane in L hand due to R wrist pain.  Falls weekly, states usually due to tripping over her own feet or knees give out.   Hx of TIAs and CVAs and seizures.  L shoulder surgery over 20 years ago.  Had been going to [facility] earlier this year, getting lumbar injections without relief.  Pt requests a refill of Zanaflex, states it is the only medicine she has received that helped her pain.    Quality of life:  Poor  Prior level of function:  On disability, lives with adult daughter and granddaughters (19 and 15 y.o.)  Sleep " "disturbance: uses SeroQuel and \"another medicine\" for sleep, uses CPAP.  Pain:     Current pain ratin    At best pain ratin    At worst pain rating:  10    Location:  Low back, B knees, B shoulders, neck, posterior LLE    Quality:  Stabbing, hot, knife-like and tingling (twisting)    Pain timing:  Constant    Relieving factors:  Position change (tylenol and muscle relaxers, zanaflex)    Aggravating factors:  Standing, movement and walking (standing > 5-10 min, walking >5-10 min )    Progression:  Worsening  Social Support:     Lives with:  Adult children  Hand dominance:  Left  Treatments:     Previous treatment:  Chiropractic and injection treatment    Current treatment:  Medication  Activities of Daily Living:     Patient reported ADL status: Tries to help with the dishes.  States she does not do much around the house.  Patient Goals:     Patient goals for therapy:  Decreased pain      Past Medical History:   Diagnosis Date   • Anxiety    • Asthma    • Back pain    • Bipolar affective disorder ([facility])    • Bronchitis    • CHF (congestive heart failure) ([facility])    • Chickenpox    • CKD (chronic kidney disease) stage 3, GFR 30-59 ml/min ([facility])    • Colon polyp    • COPD (chronic obstructive pulmonary disease) ([facility])    • Depression    • Diabetes ([facility])    • Heart attack ([facility])    • Hyperlipidemia    • Kidney disease    • Kidney stone    • Muscle disorder    • Obesity    • Pneumonia    • Pulmonary embolism ([facility])    • Seizure ([facility])    • Sleep apnea      Past Surgical History:   Procedure Laterality Date   • ABDOMINAL HYSTERECTOMY TOTAL     • APPENDECTOMY     • CARPAL TUNNEL RELEASE     • COLONOSCOPY      history of colon    • HYSTERECTOMY LAPAROSCOPY     • INTUBATION     • SINUSCOPE     • VENTILATOR CONTINUOUS       Social History   Substance Use Topics   • Smoking status: Current Every Day Smoker     Packs/day: 2.00     Years: 37.00     Types: Cigarettes   • Smokeless tobacco: Never Used      Comment: started " "smoking at age 22    • Alcohol use No     Family and Occupational History     Social History   • Marital status:      Spouse name: N/A   • Number of children: N/A   • Years of education: N/A       Objective     Observations   Central spine     Positive for Dowager's hump, forward head/neck and rounded shoulders.    Postural Observations  Seated posture: poor        Shoulder Screen    Shoulder range of motion within functional limits with the following exceptions: L shoulder flexion = 105 with pain  R shoulder flexion= 120  B shoulder ER= WNL      Neurological Testing     Myotome testing   Cervical (left)   C4 (shoulder shrug): 3+  C5 (deltoid): 4-  C6 (biceps): 4-  C7 (triceps): 3+  C8 (thumb extension): 4  T1 (intrinsics): 4    Cervical (right)   C4 (shoulder shrug): 4  C5 (deltoid): 4-  C6 (biceps): 4  C7 (triceps): 4-  C8 (thumb extension): 4  T1 (intrinsics): 4    Palpation   Left   Tenderness of the anterior deltoid, cervical paraspinals, longus colli, serratus anterior and sternocleidomastoid.     Right   Tenderness of the anterior deltoid, cervical paraspinals, longus colli, serratus anterior and sternocleidomastoid.     Active Range of Motion     Cervical Spine   Flexion: within functional limits (end range pain)  Extension: decreased (50%, feels \"stuck\")  Left lateral flexion: decreased (75% with L sided pain)  Right lateral flexion: within functional limits (L sided pain)  Left rotation: within functional limits (painful)  Right rotation: within functional limits (painful)    Joint Play   Spine     Central PA [place]        C0-1: painful       C2: painful       C3: painful       C4: painful       C5: painful       C6: painful       C7: painful       C8: painful    Additional joint play details:   Empty endfeel throughout C/S due to sensitivity and pain.        Strength:      Additional Strength Details  L  less strength than R  R painful arc with passive flexion in supine    Tests   Cervical " spine   Negative cervical spine distraction.     Left Shoulder   Positive ULTT3.   Negative ULTT2 and ULTT4.     Right Shoulder   Positive ULTT2.   Negative ULTT3 and ULTT4.   Ambulation   Weight-Bearing Status   Assistive device used: single point cane    Ambulation: Level Surfaces   Ambulation with assistive device: independent    Observational Gait   Gait: antalgic and vaulting   Decreased walking speed, stride length, left stance time, left step length and right step length.   Left foot contact pattern: heel to toe  Right foot contact pattern: heel to toe    Quality of Movement During Gait   Trunk  Forward lean.   Pt currently using cane in L hand instead of R (due to recent R wrist injury) causing pt to vault slightly to LLE for L stance.      Therapeutic Exercises (CPT 76339):     1. Supine chin tuck, x5    2. Supine pec stretch, 10 sec x5    3. Nose to shoulder UT stretch, 5 sec x5 B    4. Scap squeezes, x10    5. Shoulder circles, x10      Therapeutic Exercise Summary: Provided these exercises for HEP.      Time-based treatments/modalities:          Assessment, Response and Plan:   Impairments: abnormal ADL function, abnormal gait, abnormal muscle tone, activity intolerance, lacks appropriate home exercise program and pain with function    Assessment details:  58 y.o. Female presents with chronic widespread pain.  Pt demonstrates fair AROM of neck and shoulders, gait impairments due to lumbar and L hip pain, and generalized weakness.  Pt will benefit from skilled PT services for education re: pain neuroscience, increase aerobic endurance and strength, and improve ADL function and gait.  Barriers to therapy:  Poorly tolerated treatments, time constraints, transportation and comorbidities  Other barriers to therapy:  Chronicity of condition  Prognosis: fair    Goals:   Short Term Goals:   1. Pt will demonstrate neck AROM WNL in all directions.  2. Pt will tolerate walking with AD >20 min without increased  pain.  3. Pt will be independent with daily HEP.  Short term goal time span:  2-4 weeks      Long Term Goals:    1. Pt will demonstrate symmetrical gait pattern with AD.  2. Pt will tolerate standing > 15 min without increased pain.  3. Pt will report increased function via improved score on Neck Disability Index.  Long term goal time span:  6-8 weeks    Plan:   Therapy options:  Physical therapy treatment to continue  Planned therapy interventions:  E Stim Unattended (CPT 50918), Gait Training (CPT 25472), Mechanical Traction (CPT 01191), Neuromuscular Re-education (CPT 49551), Therapeutic Exercise (CPT 85636) and Therapeutic Activities (CPT 86005)  Frequency:  2x week  Duration in weeks:  8  Discussed with:  Patient      Functional Limitation                 Referring provider co-signature:  I have reviewed this plan of care and my co-signature certifies the need for services.  Certification Dates:   From 2/27/18     To 2/20/19    Physician Signature: ________________________________ Date: ______________

## 2018-12-28 ENCOUNTER — HOSPITAL ENCOUNTER (OUTPATIENT)
Dept: LAB | Facility: MEDICAL CENTER | Age: 58
End: 2018-12-28
Attending: INTERNAL MEDICINE
Payer: MEDICAID

## 2018-12-28 ENCOUNTER — HOSPITAL ENCOUNTER (OUTPATIENT)
Dept: RADIOLOGY | Facility: MEDICAL CENTER | Age: 58
End: 2018-12-28
Attending: INTERNAL MEDICINE
Payer: MEDICAID

## 2018-12-28 DIAGNOSIS — N18.30 STAGE 3 CHRONIC KIDNEY DISEASE (HCC): ICD-10-CM

## 2018-12-28 LAB
25(OH)D3 SERPL-MCNC: 28 NG/ML (ref 30–100)
ANION GAP SERPL CALC-SCNC: 7 MMOL/L (ref 0–11.9)
APPEARANCE UR: CLEAR
BILIRUB UR QL STRIP.AUTO: NEGATIVE
BUN SERPL-MCNC: 20 MG/DL (ref 8–22)
CALCIUM SERPL-MCNC: 9.3 MG/DL (ref 8.5–10.5)
CHLORIDE SERPL-SCNC: 110 MMOL/L (ref 96–112)
CO2 SERPL-SCNC: 24 MMOL/L (ref 20–33)
COLOR UR: YELLOW
CREAT SERPL-MCNC: 1.17 MG/DL (ref 0.5–1.4)
GLUCOSE SERPL-MCNC: 127 MG/DL (ref 65–99)
GLUCOSE UR STRIP.AUTO-MCNC: NEGATIVE MG/DL
KETONES UR STRIP.AUTO-MCNC: NEGATIVE MG/DL
LEUKOCYTE ESTERASE UR QL STRIP.AUTO: NEGATIVE
MICRO URNS: NORMAL
NITRITE UR QL STRIP.AUTO: NEGATIVE
PH UR STRIP.AUTO: 5.5 [PH]
POTASSIUM SERPL-SCNC: 4 MMOL/L (ref 3.6–5.5)
PROT UR QL STRIP: NEGATIVE MG/DL
RBC UR QL AUTO: NEGATIVE
SODIUM SERPL-SCNC: 141 MMOL/L (ref 135–145)
SP GR UR STRIP.AUTO: 1.01
UROBILINOGEN UR STRIP.AUTO-MCNC: 0.2 MG/DL

## 2018-12-28 PROCEDURE — 80048 BASIC METABOLIC PNL TOTAL CA: CPT

## 2018-12-28 PROCEDURE — 82306 VITAMIN D 25 HYDROXY: CPT

## 2018-12-28 PROCEDURE — 36415 COLL VENOUS BLD VENIPUNCTURE: CPT

## 2018-12-28 PROCEDURE — 81003 URINALYSIS AUTO W/O SCOPE: CPT

## 2018-12-28 PROCEDURE — 76775 US EXAM ABDO BACK WALL LIM: CPT

## 2018-12-31 ENCOUNTER — OFFICE VISIT (OUTPATIENT)
Dept: CARDIOLOGY | Facility: MEDICAL CENTER | Age: 58
End: 2018-12-31
Payer: MEDICAID

## 2018-12-31 VITALS
WEIGHT: 234 LBS | OXYGEN SATURATION: 93 % | BODY MASS INDEX: 38.99 KG/M2 | HEIGHT: 65 IN | SYSTOLIC BLOOD PRESSURE: 102 MMHG | DIASTOLIC BLOOD PRESSURE: 62 MMHG | HEART RATE: 84 BPM

## 2018-12-31 DIAGNOSIS — E78.2 MIXED HYPERLIPIDEMIA: ICD-10-CM

## 2018-12-31 DIAGNOSIS — R07.89 OTHER CHEST PAIN: ICD-10-CM

## 2018-12-31 DIAGNOSIS — I25.118 CORONARY ARTERY DISEASE OF NATIVE ARTERY OF NATIVE HEART WITH STABLE ANGINA PECTORIS (HCC): ICD-10-CM

## 2018-12-31 LAB — EKG IMPRESSION: NORMAL

## 2018-12-31 PROCEDURE — 99204 OFFICE O/P NEW MOD 45 MIN: CPT | Performed by: INTERNAL MEDICINE

## 2018-12-31 PROCEDURE — 93000 ELECTROCARDIOGRAM COMPLETE: CPT | Performed by: INTERNAL MEDICINE

## 2018-12-31 RX ORDER — ATORVASTATIN CALCIUM 80 MG/1
80 TABLET, FILM COATED ORAL EVERY EVENING
Qty: 30 TAB | Refills: 11 | Status: SHIPPED | OUTPATIENT
Start: 2018-12-31 | End: 2019-04-01 | Stop reason: SDUPTHER

## 2018-12-31 RX ORDER — METHOCARBAMOL 500 MG/1
1000 TABLET, FILM COATED ORAL 4 TIMES DAILY
COMMUNITY
End: 2019-01-07

## 2018-12-31 RX ORDER — CYCLOBENZAPRINE HCL 5 MG
5-10 TABLET ORAL 3 TIMES DAILY PRN
COMMUNITY
End: 2019-01-07

## 2018-12-31 NOTE — LETTER
[facility]   [address] [place] [address]  [place] [address]  Phone: [phone]  Fax: [phone]              Norma Jaimes  1960    Encounter Date: 12/31/2018    Hudson Miranda M.D.          PROGRESS NOTE:      Cardiology Initial Consultation Note    Date of note:    12/31/2018    Primary Care Provider: MARIAH Haro  Referring Provider: Cipriano Camargo A.P.    Patient Name: Norma Jaimes   YOB: 1960  MRN:              [id number]    Chief Complaint: Establish care cardiology    Norma Jaimes is a 58 y.o. female  patient presented today for follow-up in cardiology clinic.  She moved from [place] recently.  She has a history of coronary artery disease according to her, she says they cleaned up her veins in the heart.  She does not know whether she had any stents.  She says she has chronic chest pains, stabbing type, worse with respiration, radiating to back, presents all day.  She says she continues to smoke, not taking her insulin because she hates giving her shots.  She is not very compliant with her other medications as well.    ROS  All systems reviewed, pertinent positives are excessive sweating, headache, congestion, sore throat, blurred vision, eye discharge, chest pain, dyspnea on exertion, leg pain with exercise, cough, sputum production, heartburn, neck pain back pain, seasonal allergies, dizziness, depression, anxiety, memory loss.      All other systems reviewed and discussed using a comprehensive questionnaire and are negative.     Past medical history, family history, social history, allergies and labs are reviewed and updated as needed as documented below.    Past Medical History:   Diagnosis Date   • Anxiety    • Asthma    • Back pain    • Bipolar affective disorder (HCC)    • Bronchitis    • CHF (congestive heart failure) (HCC)    • Chickenpox    • CKD (chronic kidney disease)  stage 3, GFR 30-59 ml/min ([facility])    • Colon polyp    • COPD (chronic obstructive pulmonary disease) ([facility])    • Depression    • Diabetes (HCC)    • Heart attack (HCC)    • Hyperlipidemia    • Kidney disease    • Kidney stone    • Muscle disorder    • Obesity    • Pneumonia    • Pulmonary embolism (HCC)    • Seizure (HCC)    • Sleep apnea          Past Surgical History:   Procedure Laterality Date   • ABDOMINAL HYSTERECTOMY TOTAL     • APPENDECTOMY     • CARPAL TUNNEL RELEASE     • COLONOSCOPY      history of colon    • HYSTERECTOMY LAPAROSCOPY     • INTUBATION     • SINUSCOPE     • VENTILATOR CONTINUOUS           Current Outpatient Prescriptions   Medication Sig Dispense Refill   • cyclobenzaprine (FLEXERIL) 5 MG tablet Take 5-10 mg by mouth 3 times a day as needed.     • methocarbamol (ROBAXIN) 500 MG Tab Take 1,000 mg by mouth 4 times a day.     • atorvastatin (LIPITOR) 80 MG tablet Take 1 Tab by mouth every evening. 30 Tab 11   • potassium chloride SA (K-DUR) 10 MEQ Tab CR TAKE 1 TABLET BY MOUTH TWICE A DAY 60 Tab 0   • ranitidine (ZANTAC) 300 MG tablet TAKE 1 TAB BY MOUTH EVERY DAY. TAKE 1 TABLET BY MOUTH TWICE A DAY 60 Tab 0   • levetiracetam (KEPPRA) 1000 MG tablet TAKE 1.5 TABS BY MOUTH 2 TIMES A DAY. 90 Tab 0   • furosemide (LASIX) 40 MG Tab Take 1 Tab by mouth every day. 45 Tab 0   • AGGRENOX  MG CAPSULE SR 12 HR TAKE 1 CAPSULE BY MOUTH TWICE A DAY 60 Cap 0   • topiramate (TOPAMAX) 50 MG tablet TAKE 1 TAB BY MOUTH 2 TIMES A DAY. TAKE 1 TABLET BY MOUTH TWICE A DAY 60 Tab 0   • LYRICA 200 MG capsule TAKE 1 CAP BY MOUTH TWICE A DAY FOR 30 DAYS 60 Cap 0   • LANTUS 100 UNIT/ML Solution INJECT 80 UNITS AS INSTRUCTED AT BEDTIME AS NEEDED. 20 mL 1   • venlafaxine XR (EFFEXOR XR) 75 MG CAPSULE SR 24 HR Take 1 Cap by mouth every day. 30 Cap 0   • metFORMIN (GLUCOPHAGE) 500 MG Tab Take 1 Tab by mouth 2 times a day, with meals. 180 Tab 0   • hydrOXYzine HCl (ATARAX) 10 MG Tab Take 1 Tab by mouth 2 times a day as  needed for Itching. 60 Tab 1   • metoprolol (LOPRESSOR) 25 MG Tab Take 1 Tab by mouth every day. 90 Tab 0   • ergocalciferol (DRISDOL) 48094 UNIT capsule Take 1 Cap by mouth every 7 days. 12 Cap 2   • mirtazapine (REMERON) 45 MG tablet TAKE 1 TAB BY MOUTH 1 TIME DAILY AS NEEDED. 30 Tab 0   • busPIRone (BUSPAR) 15 MG tablet Take 0.5 Tabs by mouth 3 times a day. 90 Tab 0   • Cranberry 1000 MG Cap Take 1,000 mg by mouth every day.     • nitroglycerin (NITRODUR) 0.2 MG/HR PATCH 24 HR Apply 1 Patch to skin as directed every day.     • polyethylene glycol/lytes (MIRALAX) Pack Take 1 Packet by mouth every day. 1 Each 1   • quetiapine (SEROQUEL) 400 MG tablet Take 2 Tabs by mouth every bedtime. 60 Tab 1   • insulin lispro (HUMALOG) 100 UNIT/ML Solution Inject 40 Units as instructed 3 times a day before meals. 20 mL 1   • albuterol (PROVENTIL HFA) 108 (90 Base) MCG/ACT Aero Soln inhalation aerosol Inhale 2 Puffs by mouth every 6 hours as needed for Shortness of Breath. 8.5 g 2   • tizanidine (ZANAFLEX) 4 MG Tab Take 1 Tab by mouth every 6 hours as needed. (Patient not taking: Reported on 12/31/2018) 30 Tab 0   • LANTUS 100 UNIT/ML Solution INJECT 80 UNITS AS INSTRUCTED AT BEDTIME AS NEEDED. (Patient not taking: Reported on 12/31/2018) 60 mL 2   • glucose blood (ONE TOUCH ULTRA TEST) strip 1 Strip by Other route 4 times a day. ONE TOUCH ULTRA TWO (Patient not taking: Reported on 12/31/2018) 150 Strip 5   • Blood Glucose Monitoring Suppl (ONE TOUCH ULTRA SYSTEM KIT) w/Device Kit 1 glucometer for use w DM-2 , ON metformin and Insulin Check 4/day 1 Kit 0   • simvastatin (ZOCOR) 10 MG Tab Take 1 Tab by mouth every evening. (Patient not taking: Reported on 12/31/2018) 30 Tab 2   • ONE TOUCH LANCETS Misc Test bld sugar 4x per day w lancets (Patient not taking: Reported on 12/31/2018) 200 Each 2   • Lancet Devices Misc 1 Applicator by Does not apply route 3 times a day. (Patient not taking: Reported on 11/14/2018) 100 Applicator 11   "  • Blood Glucose Monitoring Suppl Supplies Misc One Touch Verio Flex meter 1 Each 0   • Blood Glucose Monitoring Suppl Supplies Misc One Touch Verio test strips testing 3 times daily testing for insulin adjustment. 100 Strip 11   • oxyCODONE-acetaminophen (ROXICET) 5-325 MG/5ML Solution Take 5 mL by mouth every four hours as needed.     • doxycycline (MONODOX) 100 MG capsule Take 1 Cap by mouth 2 times a day. (Patient not taking: Reported on 11/14/2018) 14 Cap 0   • mupirocin (BACTROBAN) 2 % Ointment Apply to skin infection twice daily (Patient not taking: Reported on 12/31/2018) 1 Tube 2   • Dexlansoprazole (DEXILANT) 60 MG CAPSULE DELAYED RELEASE delayed-release capsule Take 60 mg by mouth every day. (Patient not taking: Reported on 11/14/2018) 30 Cap 2   • Plecanatide (TRULANCE) 3 MG Tab Take 3 mg by mouth every day. (Patient not taking: Reported on 12/31/2018) 30 Tab 1   • atorvastatin (LIPITOR) 40 MG Tab Take 1 Tab by mouth every day. (Patient not taking: Reported on 12/31/2018) 30 Tab 1   • Insulin Syringe-Needle U-100 (INSULIN SYRINGE .5CC/30GX1/2\") 30G X 1/2\" 0.5 ML Misc Two types of insulin, inject 4x day 200 Each 1     No current facility-administered medications for this visit.          Allergies   Allergen Reactions   • Imitrex [Sumatriptan]      palpitations     • Baclofen    • Ees [Erythromycin]    • Erythromycin [Akne-Mycin]          Family History   Problem Relation Age of Onset   • Heart Disease Mother    • Hypertension Mother    • Diabetes Father    • Cancer Maternal Grandfather    • Diabetes Paternal Grandmother    • Diabetes Paternal Grandfather    • Lung Disease Sister    • Cancer Son          Social History     Social History   • Marital status:      Spouse name: N/A   • Number of children: N/A   • Years of education: N/A     Occupational History   • Not on file.     Social History Main Topics   • Smoking status: Current Every Day Smoker     Packs/day: 2.00     Years: 37.00     Types: " "Cigarettes   • Smokeless tobacco: Never Used      Comment: started smoking at age 22    • Alcohol use No   • Drug use: No   • Sexual activity: Not Currently     Other Topics Concern   • Not on file     Social History Narrative   • No narrative on file         Physical Exam:  Ambulatory Vitals  Blood pressure 102/62, pulse 84, height 1.651 m (5' 5\"), weight 106.1 kg (234 lb), SpO2 93 %.   Oxygen Therapy:  Pulse Oximetry: 93 %  BP Readings from Last 4 Encounters:   12/31/18 102/62   12/13/18 112/70   12/03/18 112/62   11/14/18 102/62       Weight/BMI: Body mass index is 38.94 kg/m².  Wt Readings from Last 4 Encounters:   12/31/18 106.1 kg (234 lb)   12/13/18 104.3 kg (230 lb)   12/03/18 106.1 kg (234 lb)   11/14/18 105.7 kg (233 lb)           General: Well appearing and in no apparent distress  Head: atrumatic  Eyes: No conjunctival pallor   ENT: normal external appearance of nose and ears  Neck: JVD absent, carotid bruits absent  Lungs: Bilateral wheeze  Heart: Regular rhythm,   No palpable thrills on palpation, murmurs absent, no rubs,   Lowe extremity edema absent.   Pedal pulses diminished  Abdomen: soft, non tender, non distended.  Extremities/MSK: no clubbing, no cyanosis  Neurological: normal orientation, Gait abnormal  Psychiatric: Appropriate affect, intact judgement and insight  Skin: Warm extremities      Lab Data Review:  Lab Results   Component Value Date/Time    CHOLSTRLTOT 264 (H) 09/28/2018 10:00 AM     (H) 09/28/2018 10:00 AM    HDL 55 09/28/2018 10:00 AM    TRIGLYCERIDE 245 (H) 09/28/2018 10:00 AM       Lab Results   Component Value Date/Time    SODIUM 141 12/28/2018 11:16 AM    POTASSIUM 4.0 12/28/2018 11:16 AM    CHLORIDE 110 12/28/2018 11:16 AM    CO2 24 12/28/2018 11:16 AM    GLUCOSE 127 (H) 12/28/2018 11:16 AM    BUN 20 12/28/2018 11:16 AM    CREATININE 1.17 12/28/2018 11:16 AM     Lab Results   Component Value Date/Time    ALKPHOSPHAT 106 (H) 09/28/2018 10:01 AM    HERONOT 21 09/28/2018 " 10:01 AM    ALTSGPT 18 2018 10:01 AM    TBILIRUBIN 0.4 2018 10:01 AM      Lab Results   Component Value Date/Time    WBC 9.5 2018 10:01 AM     No components found for: HBGA1C  No components found for: TROPONIN  No components found for: BNP      Cardiac Imaging and Procedures Review:    EKG dated 2018 : My personal interpretation is sinus rhythm, nonspecific T wave abnormalities laterally      Medical Decision Makin. ?  Coronary artery disease  2.  Atypical chest pain  3.  Hyperlipidemia  4.  Uncontrolled diabetes mellitus  5. ?  History of stroke  6.  Anxiety  7.  Active smoking    Counseled patient on smoking cessation, need to take insulin regularly to prevent further cardiac vascular events.  I would increase her Lipitor to 80 mg daily.  Stop gemfibrozil.  Will obtain records from [place].  Her chest pain is very atypical in nature.  We will perform an echocardiogram, to assess her LV function and RV function.  She complains of claudication, with diabetes out of control, and if patient does not want to take insulin, further workup and intervention would be futile.  I advised her to be compliant with medications, will evaluate her for PVD next visit.      Return in about 6 months (around 2019).    This note was dictated using Dragon speech recognition software.    Hudson SELLERS  Interventional cardiologist  [facility]  [facility] [address]  [address] [address]  Phone: [phone]  Fax: [phone]                  No Recipients

## 2018-12-31 NOTE — PROGRESS NOTES
Cardiology Initial Consultation Note    Date of note:    12/31/2018    Primary Care Provider: MARIAH Haro  Referring Provider: Cipriano Camargo A.P.    Patient Name: Norma Jaimes   YOB: 1960  MRN:              [id number]    Chief Complaint: Establish care cardiology    Norma Jaimes is a 58 y.o. female  patient presented today for follow-up in cardiology clinic.  She moved from [place] recently.  She has a history of coronary artery disease according to her, she says they cleaned up her veins in the heart.  She does not know whether she had any stents.  She says she has chronic chest pains, stabbing type, worse with respiration, radiating to back, presents all day.  She says she continues to smoke, not taking her insulin because she hates giving her shots.  She is not very compliant with her other medications as well.    ROS  All systems reviewed, pertinent positives are excessive sweating, headache, congestion, sore throat, blurred vision, eye discharge, chest pain, dyspnea on exertion, leg pain with exercise, cough, sputum production, heartburn, neck pain back pain, seasonal allergies, dizziness, depression, anxiety, memory loss.      All other systems reviewed and discussed using a comprehensive questionnaire and are negative.     Past medical history, family history, social history, allergies and labs are reviewed and updated as needed as documented below.    Past Medical History:   Diagnosis Date   • Anxiety    • Asthma    • Back pain    • Bipolar affective disorder ([facility])    • Bronchitis    • CHF (congestive heart failure) ([facility])    • Chickenpox    • CKD (chronic kidney disease) stage 3, GFR 30-59 ml/min ([facility])    • Colon polyp    • COPD (chronic obstructive pulmonary disease) ([facility])    • Depression    • Diabetes ([facility])    • Heart attack ([facility])    • Hyperlipidemia    • Kidney disease    • Kidney stone    • Muscle disorder    • Obesity    • Pneumonia    •  Pulmonary embolism (HCC)    • Seizure (HCC)    • Sleep apnea          Past Surgical History:   Procedure Laterality Date   • ABDOMINAL HYSTERECTOMY TOTAL     • APPENDECTOMY     • CARPAL TUNNEL RELEASE     • COLONOSCOPY      history of colon    • HYSTERECTOMY LAPAROSCOPY     • INTUBATION     • SINUSCOPE     • VENTILATOR CONTINUOUS           Current Outpatient Prescriptions   Medication Sig Dispense Refill   • cyclobenzaprine (FLEXERIL) 5 MG tablet Take 5-10 mg by mouth 3 times a day as needed.     • methocarbamol (ROBAXIN) 500 MG Tab Take 1,000 mg by mouth 4 times a day.     • atorvastatin (LIPITOR) 80 MG tablet Take 1 Tab by mouth every evening. 30 Tab 11   • potassium chloride SA (K-DUR) 10 MEQ Tab CR TAKE 1 TABLET BY MOUTH TWICE A DAY 60 Tab 0   • ranitidine (ZANTAC) 300 MG tablet TAKE 1 TAB BY MOUTH EVERY DAY. TAKE 1 TABLET BY MOUTH TWICE A DAY 60 Tab 0   • levetiracetam (KEPPRA) 1000 MG tablet TAKE 1.5 TABS BY MOUTH 2 TIMES A DAY. 90 Tab 0   • furosemide (LASIX) 40 MG Tab Take 1 Tab by mouth every day. 45 Tab 0   • AGGRENOX  MG CAPSULE SR 12 HR TAKE 1 CAPSULE BY MOUTH TWICE A DAY 60 Cap 0   • topiramate (TOPAMAX) 50 MG tablet TAKE 1 TAB BY MOUTH 2 TIMES A DAY. TAKE 1 TABLET BY MOUTH TWICE A DAY 60 Tab 0   • LYRICA 200 MG capsule TAKE 1 CAP BY MOUTH TWICE A DAY FOR 30 DAYS 60 Cap 0   • LANTUS 100 UNIT/ML Solution INJECT 80 UNITS AS INSTRUCTED AT BEDTIME AS NEEDED. 20 mL 1   • venlafaxine XR (EFFEXOR XR) 75 MG CAPSULE SR 24 HR Take 1 Cap by mouth every day. 30 Cap 0   • metFORMIN (GLUCOPHAGE) 500 MG Tab Take 1 Tab by mouth 2 times a day, with meals. 180 Tab 0   • hydrOXYzine HCl (ATARAX) 10 MG Tab Take 1 Tab by mouth 2 times a day as needed for Itching. 60 Tab 1   • metoprolol (LOPRESSOR) 25 MG Tab Take 1 Tab by mouth every day. 90 Tab 0   • ergocalciferol (DRISDOL) 58262 UNIT capsule Take 1 Cap by mouth every 7 days. 12 Cap 2   • mirtazapine (REMERON) 45 MG tablet TAKE 1 TAB BY MOUTH 1 TIME DAILY AS NEEDED.  30 Tab 0   • busPIRone (BUSPAR) 15 MG tablet Take 0.5 Tabs by mouth 3 times a day. 90 Tab 0   • Cranberry 1000 MG Cap Take 1,000 mg by mouth every day.     • nitroglycerin (NITRODUR) 0.2 MG/HR PATCH 24 HR Apply 1 Patch to skin as directed every day.     • polyethylene glycol/lytes (MIRALAX) Pack Take 1 Packet by mouth every day. 1 Each 1   • quetiapine (SEROQUEL) 400 MG tablet Take 2 Tabs by mouth every bedtime. 60 Tab 1   • insulin lispro (HUMALOG) 100 UNIT/ML Solution Inject 40 Units as instructed 3 times a day before meals. 20 mL 1   • albuterol (PROVENTIL HFA) 108 (90 Base) MCG/ACT Aero Soln inhalation aerosol Inhale 2 Puffs by mouth every 6 hours as needed for Shortness of Breath. 8.5 g 2   • tizanidine (ZANAFLEX) 4 MG Tab Take 1 Tab by mouth every 6 hours as needed. (Patient not taking: Reported on 12/31/2018) 30 Tab 0   • LANTUS 100 UNIT/ML Solution INJECT 80 UNITS AS INSTRUCTED AT BEDTIME AS NEEDED. (Patient not taking: Reported on 12/31/2018) 60 mL 2   • glucose blood (ONE TOUCH ULTRA TEST) strip 1 Strip by Other route 4 times a day. ONE TOUCH ULTRA TWO (Patient not taking: Reported on 12/31/2018) 150 Strip 5   • Blood Glucose Monitoring Suppl (ONE TOUCH ULTRA SYSTEM KIT) w/Device Kit 1 glucometer for use w DM-2 , ON metformin and Insulin Check 4/day 1 Kit 0   • simvastatin (ZOCOR) 10 MG Tab Take 1 Tab by mouth every evening. (Patient not taking: Reported on 12/31/2018) 30 Tab 2   • ONE TOUCH LANCETS Misc Test bld sugar 4x per day w lancets (Patient not taking: Reported on 12/31/2018) 200 Each 2   • Lancet Devices Misc 1 Applicator by Does not apply route 3 times a day. (Patient not taking: Reported on 11/14/2018) 100 Applicator 11   • Blood Glucose Monitoring Suppl Supplies Misc One Touch Verio Flex meter 1 Each 0   • Blood Glucose Monitoring Suppl Supplies Misc One Touch Verio test strips testing 3 times daily testing for insulin adjustment. 100 Strip 11   • oxyCODONE-acetaminophen (ROXICET) 5-325 MG/5ML  "Solution Take 5 mL by mouth every four hours as needed.     • doxycycline (MONODOX) 100 MG capsule Take 1 Cap by mouth 2 times a day. (Patient not taking: Reported on 11/14/2018) 14 Cap 0   • mupirocin (BACTROBAN) 2 % Ointment Apply to skin infection twice daily (Patient not taking: Reported on 12/31/2018) 1 Tube 2   • Dexlansoprazole (DEXILANT) 60 MG CAPSULE DELAYED RELEASE delayed-release capsule Take 60 mg by mouth every day. (Patient not taking: Reported on 11/14/2018) 30 Cap 2   • Plecanatide (TRULANCE) 3 MG Tab Take 3 mg by mouth every day. (Patient not taking: Reported on 12/31/2018) 30 Tab 1   • atorvastatin (LIPITOR) 40 MG Tab Take 1 Tab by mouth every day. (Patient not taking: Reported on 12/31/2018) 30 Tab 1   • Insulin Syringe-Needle U-100 (INSULIN SYRINGE .5CC/30GX1/2\") 30G X 1/2\" 0.5 ML Misc Two types of insulin, inject 4x day 200 Each 1     No current facility-administered medications for this visit.          Allergies   Allergen Reactions   • Imitrex [Sumatriptan]      palpitations     • Baclofen    • Ees [Erythromycin]    • Erythromycin [Akne-Mycin]          Family History   Problem Relation Age of Onset   • Heart Disease Mother    • Hypertension Mother    • Diabetes Father    • Cancer Maternal Grandfather    • Diabetes Paternal Grandmother    • Diabetes Paternal Grandfather    • Lung Disease Sister    • Cancer Son          Social History     Social History   • Marital status:      Spouse name: N/A   • Number of children: N/A   • Years of education: N/A     Occupational History   • Not on file.     Social History Main Topics   • Smoking status: Current Every Day Smoker     Packs/day: 2.00     Years: 37.00     Types: Cigarettes   • Smokeless tobacco: Never Used      Comment: started smoking at age 22    • Alcohol use No   • Drug use: No   • Sexual activity: Not Currently     Other Topics Concern   • Not on file     Social History Narrative   • No narrative on file         Physical " "Exam:  Ambulatory Vitals  Blood pressure 102/62, pulse 84, height 1.651 m (5' 5\"), weight 106.1 kg (234 lb), SpO2 93 %.   Oxygen Therapy:  Pulse Oximetry: 93 %  BP Readings from Last 4 Encounters:   12/31/18 102/62   12/13/18 112/70   12/03/18 112/62   11/14/18 102/62       Weight/BMI: Body mass index is 38.94 kg/m².  Wt Readings from Last 4 Encounters:   12/31/18 106.1 kg (234 lb)   12/13/18 104.3 kg (230 lb)   12/03/18 106.1 kg (234 lb)   11/14/18 105.7 kg (233 lb)           General: Well appearing and in no apparent distress  Head: atrumatic  Eyes: No conjunctival pallor   ENT: normal external appearance of nose and ears  Neck: JVD absent, carotid bruits absent  Lungs: Bilateral wheeze  Heart: Regular rhythm,   No palpable thrills on palpation, murmurs absent, no rubs,   Lowe extremity edema absent.   Pedal pulses diminished  Abdomen: soft, non tender, non distended.  Extremities/MSK: no clubbing, no cyanosis  Neurological: normal orientation, Gait abnormal  Psychiatric: Appropriate affect, intact judgement and insight  Skin: Warm extremities      Lab Data Review:  Lab Results   Component Value Date/Time    CHOLSTRLTOT 264 (H) 09/28/2018 10:00 AM     (H) 09/28/2018 10:00 AM    HDL 55 09/28/2018 10:00 AM    TRIGLYCERIDE 245 (H) 09/28/2018 10:00 AM       Lab Results   Component Value Date/Time    SODIUM 141 12/28/2018 11:16 AM    POTASSIUM 4.0 12/28/2018 11:16 AM    CHLORIDE 110 12/28/2018 11:16 AM    CO2 24 12/28/2018 11:16 AM    GLUCOSE 127 (H) 12/28/2018 11:16 AM    BUN 20 12/28/2018 11:16 AM    CREATININE 1.17 12/28/2018 11:16 AM     Lab Results   Component Value Date/Time    ALKPHOSPHAT 106 (H) 09/28/2018 10:01 AM    ASTSGOT 21 09/28/2018 10:01 AM    ALTSGPT 18 09/28/2018 10:01 AM    TBILIRUBIN 0.4 09/28/2018 10:01 AM      Lab Results   Component Value Date/Time    WBC 9.5 09/28/2018 10:01 AM     No components found for: HBGA1C  No components found for: TROPONIN  No components found for: " BNP      Cardiac Imaging and Procedures Review:    EKG dated 2018 : My personal interpretation is sinus rhythm, nonspecific T wave abnormalities laterally      Medical Decision Makin. ?  Coronary artery disease  2.  Atypical chest pain  3.  Hyperlipidemia  4.  Uncontrolled diabetes mellitus  5. ?  History of stroke  6.  Anxiety  7.  Active smoking    Counseled patient on smoking cessation, need to take insulin regularly to prevent further cardiac vascular events.  I would increase her Lipitor to 80 mg daily.  Stop gemfibrozil.  Will obtain records from [place].  Her chest pain is very atypical in nature.  We will perform an echocardiogram, to assess her LV function and RV function.  She complains of claudication, with diabetes out of control, and if patient does not want to take insulin, further workup and intervention would be futile.  I advised her to be compliant with medications, will evaluate her for PVD next visit.      Return in about 6 months (around 2019).    This note was dictated using Dragon speech recognition software.    Hudson SELLERS  Interventional cardiologist  [facility]  [address] [address]  Phone: [phone]  Fax: [phone]

## 2019-01-04 RX ORDER — ZOLPIDEM TARTRATE 5 MG/1
TABLET ORAL
COMMUNITY
Start: 2018-12-24 | End: 2019-01-07

## 2019-01-04 RX ORDER — CODEINE PHOSPHATE AND GUAIFENESIN 10; 100 MG/5ML; MG/5ML
SOLUTION ORAL
Refills: 0 | COMMUNITY
Start: 2018-11-14 | End: 2019-01-07

## 2019-01-04 RX ORDER — BUSPIRONE HYDROCHLORIDE 30 MG/1
TABLET ORAL
Refills: 0 | COMMUNITY
Start: 2018-11-10 | End: 2019-04-01 | Stop reason: SDUPTHER

## 2019-01-04 RX ORDER — HYDROXYZINE PAMOATE 25 MG/1
CAPSULE ORAL
COMMUNITY
Start: 2018-12-22 | End: 2019-01-07

## 2019-01-04 RX ORDER — VENLAFAXINE HYDROCHLORIDE 37.5 MG/1
CAPSULE, EXTENDED RELEASE ORAL
COMMUNITY
Start: 2018-12-13 | End: 2019-01-07

## 2019-01-04 RX ORDER — METHOCARBAMOL 500 MG/1
TABLET, FILM COATED ORAL
Refills: 1 | COMMUNITY
Start: 2018-10-19 | End: 2019-01-07

## 2019-01-04 RX ORDER — MIRTAZAPINE 30 MG/1
TABLET, FILM COATED ORAL
COMMUNITY
Start: 2018-12-12 | End: 2019-01-07

## 2019-01-04 RX ORDER — VENLAFAXINE HYDROCHLORIDE 150 MG/1
CAPSULE, EXTENDED RELEASE ORAL
COMMUNITY
Start: 2018-12-22

## 2019-01-04 RX ORDER — DOXYCYCLINE HYCLATE 100 MG
TABLET ORAL
Refills: 0 | COMMUNITY
Start: 2018-11-14 | End: 2019-04-01

## 2019-01-04 RX ORDER — DEXAMETHASONE SODIUM PHOSPHATE 10 MG/ML
INJECTION INTRAMUSCULAR; INTRAVENOUS
COMMUNITY
Start: 2018-10-15 | End: 2019-01-07

## 2019-01-07 ENCOUNTER — TELEPHONE (OUTPATIENT)
Dept: MEDICAL GROUP | Facility: MEDICAL CENTER | Age: 59
End: 2019-01-07

## 2019-01-07 ENCOUNTER — OFFICE VISIT (OUTPATIENT)
Dept: MEDICAL GROUP | Facility: MEDICAL CENTER | Age: 59
End: 2019-01-07
Attending: NURSE PRACTITIONER
Payer: MEDICAID

## 2019-01-07 VITALS
WEIGHT: 233 LBS | OXYGEN SATURATION: 93 % | SYSTOLIC BLOOD PRESSURE: 100 MMHG | DIASTOLIC BLOOD PRESSURE: 70 MMHG | HEART RATE: 76 BPM | RESPIRATION RATE: 16 BRPM | BODY MASS INDEX: 38.82 KG/M2 | TEMPERATURE: 97.9 F | HEIGHT: 65 IN

## 2019-01-07 DIAGNOSIS — R10.9 STOMACH ACHE: ICD-10-CM

## 2019-01-07 DIAGNOSIS — F99 PSYCHIATRIC DISORDER: ICD-10-CM

## 2019-01-07 DIAGNOSIS — M25.531 RIGHT WRIST PAIN: ICD-10-CM

## 2019-01-07 DIAGNOSIS — R51.9 HEADACHE, UNSPECIFIED HEADACHE TYPE: ICD-10-CM

## 2019-01-07 DIAGNOSIS — I25.10 CAD, MULTIPLE VESSEL: ICD-10-CM

## 2019-01-07 DIAGNOSIS — E55.9 VITAMIN D DEFICIENCY: ICD-10-CM

## 2019-01-07 DIAGNOSIS — M79.7 FIBROMYALGIA: ICD-10-CM

## 2019-01-07 DIAGNOSIS — E78.2 MIXED HYPERLIPIDEMIA: ICD-10-CM

## 2019-01-07 DIAGNOSIS — Z79.899 MEDICATION MANAGEMENT: ICD-10-CM

## 2019-01-07 DIAGNOSIS — N18.30 STAGE 3 CHRONIC KIDNEY DISEASE (HCC): ICD-10-CM

## 2019-01-07 DIAGNOSIS — G89.4 CHRONIC PAIN SYNDROME: ICD-10-CM

## 2019-01-07 DIAGNOSIS — R11.0 NAUSEA: ICD-10-CM

## 2019-01-07 DIAGNOSIS — I10 ESSENTIAL HYPERTENSION: ICD-10-CM

## 2019-01-07 DIAGNOSIS — K21.00 GASTROESOPHAGEAL REFLUX DISEASE WITH ESOPHAGITIS: ICD-10-CM

## 2019-01-07 DIAGNOSIS — G40.909 SEIZURE DISORDER (HCC): ICD-10-CM

## 2019-01-07 PROBLEM — M25.532 LEFT WRIST PAIN: Status: ACTIVE | Noted: 2019-01-07

## 2019-01-07 PROCEDURE — 99214 OFFICE O/P EST MOD 30 MIN: CPT | Performed by: NURSE PRACTITIONER

## 2019-01-07 RX ORDER — PANTOPRAZOLE SODIUM 20 MG/1
20 TABLET, DELAYED RELEASE ORAL DAILY
Qty: 30 TAB | Refills: 2 | Status: SHIPPED | OUTPATIENT
Start: 2019-01-07 | End: 2019-04-01 | Stop reason: SDUPTHER

## 2019-01-07 RX ORDER — NAPROXEN SODIUM 220 MG
TABLET ORAL
Qty: 150 EACH | Refills: 2 | Status: SHIPPED | OUTPATIENT
Start: 2019-01-07 | End: 2019-04-01 | Stop reason: SDUPTHER

## 2019-01-07 RX ORDER — PREGABALIN 200 MG/1
200 CAPSULE ORAL 2 TIMES DAILY
Qty: 60 CAP | Refills: 0 | Status: SHIPPED | OUTPATIENT
Start: 2019-02-07 | End: 2019-03-09

## 2019-01-07 RX ORDER — PREGABALIN 200 MG/1
200 CAPSULE ORAL 2 TIMES DAILY
Qty: 60 CAP | Refills: 0 | Status: SHIPPED | OUTPATIENT
Start: 2019-01-07 | End: 2019-02-01 | Stop reason: SDUPTHER

## 2019-01-07 RX ORDER — POTASSIUM CHLORIDE 750 MG/1
10 TABLET, EXTENDED RELEASE ORAL 2 TIMES DAILY
Qty: 60 TAB | Refills: 2 | Status: SHIPPED | OUTPATIENT
Start: 2019-01-07 | End: 2019-04-01 | Stop reason: SDUPTHER

## 2019-01-07 RX ORDER — LEVETIRACETAM 1000 MG/1
1500 TABLET ORAL 2 TIMES DAILY
Qty: 90 TAB | Refills: 2 | Status: SHIPPED | OUTPATIENT
Start: 2019-01-07 | End: 2019-05-26 | Stop reason: SDUPTHER

## 2019-01-07 RX ORDER — ASPIRIN/DIPYRIDAMOLE 25MG-200MG
1 CAPSULE,EXTENDED RELEASE MULTIPHASE 12HR ORAL 2 TIMES DAILY
Qty: 60 CAP | Refills: 2 | Status: SHIPPED | OUTPATIENT
Start: 2019-01-07

## 2019-01-07 RX ORDER — TIZANIDINE 4 MG/1
4 TABLET ORAL EVERY 6 HOURS PRN
Qty: 120 TAB | Refills: 2 | Status: SHIPPED | OUTPATIENT
Start: 2019-01-07 | End: 2019-04-01 | Stop reason: SDUPTHER

## 2019-01-07 RX ORDER — ERGOCALCIFEROL 1.25 MG/1
50000 CAPSULE ORAL
Qty: 4 CAP | Refills: 11 | Status: SHIPPED | OUTPATIENT
Start: 2019-01-07 | End: 2019-04-01 | Stop reason: SDUPTHER

## 2019-01-07 RX ORDER — FUROSEMIDE 40 MG/1
40 TABLET ORAL
Qty: 30 TAB | Refills: 2 | Status: SHIPPED | OUTPATIENT
Start: 2019-01-07 | End: 2019-02-01 | Stop reason: SDUPTHER

## 2019-01-07 RX ORDER — LANCETS 30 GAUGE
EACH MISCELLANEOUS
Qty: 200 EACH | Refills: 2 | Status: SHIPPED | OUTPATIENT
Start: 2019-01-07 | End: 2019-04-01 | Stop reason: SDUPTHER

## 2019-01-07 RX ORDER — SUCRALFATE 1 G/1
1 TABLET ORAL
Qty: 60 TAB | Refills: 1 | Status: SHIPPED | OUTPATIENT
Start: 2019-01-07 | End: 2019-02-03 | Stop reason: SDUPTHER

## 2019-01-07 RX ORDER — TOPIRAMATE 50 MG/1
50 TABLET, FILM COATED ORAL 2 TIMES DAILY
Qty: 60 TAB | Refills: 2 | Status: SHIPPED | OUTPATIENT
Start: 2019-01-07 | End: 2019-04-01 | Stop reason: SDUPTHER

## 2019-01-07 RX ORDER — INSULIN GLARGINE 100 [IU]/ML
80 INJECTION, SOLUTION SUBCUTANEOUS NIGHTLY PRN
Qty: 20 ML | Refills: 2 | Status: SHIPPED | OUTPATIENT
Start: 2019-01-07 | End: 2019-04-01 | Stop reason: SDUPTHER

## 2019-01-07 RX ORDER — ONDANSETRON 4 MG/1
4 TABLET, ORALLY DISINTEGRATING ORAL EVERY 6 HOURS PRN
Qty: 20 TAB | Refills: 1 | Status: SHIPPED | OUTPATIENT
Start: 2019-01-07 | End: 2019-04-01 | Stop reason: SDUPTHER

## 2019-01-07 ASSESSMENT — PATIENT HEALTH QUESTIONNAIRE - PHQ9
CLINICAL INTERPRETATION OF PHQ2 SCORE: 4
5. POOR APPETITE OR OVEREATING: 2 - MORE THAN HALF THE DAYS
SUM OF ALL RESPONSES TO PHQ QUESTIONS 1-9: 15

## 2019-01-07 NOTE — ASSESSMENT & PLAN NOTE
"Has had \"stomach flu\" at home w multiple family members.  Headache, nausea and vomiting.  States has been a mild epigastric pain \"acid\" in area  States Omeprazole \"never helped\" Reports in past  Zantac did not help. IN past Protonix helped.    Discussed trial of Protonix and Carafate.  "

## 2019-01-07 NOTE — ASSESSMENT & PLAN NOTE
Recently saw Cardiology  INstructed to continue Lipitor 80 mg/day and stop Gemfibrozil.  Hx of high Total and high LDL Cholesterol, high Triglycerides.

## 2019-01-07 NOTE — ASSESSMENT & PLAN NOTE
Recent Vitamin D labs showing improvement  Is taking weekly Vitamin D RX supplement:  Results for WILBERT REESE (MRN [id number]) as of 1/7/2019 11:08   Ref. Range 9/28/2018 10:01 12/28/2018 11:16   25-Hydroxy   Vitamin D 25 Latest Ref Range: 30 - 100 ng/mL 9 (L) 28 (L)   Recommend she continue her weekly Vit D supplement

## 2019-01-07 NOTE — PROGRESS NOTES
"Chief Complaint:   Chief Complaint   Patient presents with   • Abdominal Pain     stomach, x3 days   • Headache     x 3 days   • Medication Refill     add tizanidine   • Arm Pain     Right wrist, hurt while walking dog       HPI:  Norma is here today for a follow-up on Chronic Pain, Referrals,  Medication Review and multiple Med Refills, Right wrist pain    Her PMH includes:  Anxiety and Depression  Bipolar Disorder  Fibromyalgia  Seizures  HTN  MAGNOLIA  CAD  DM  Morbid Obesity  \"fibromyalgia\"  Chronic pain  Colon Polyps  Hiatal Hernia  Hyperlipidemia  Short term memory concerns  Kidney Disease  Tobacco abuse- smoking  Constipation  GERD  Cervical/Uterine Cancer w hysterectomy      Referrals Approved:  Cardiology, Nephrology, GYN, Sleep Study, Psychiatry, Psychology, GI  Podiatry, Ophthalmology, [facility], Diabetic Education,  [facility].     Nev  Report:  12/29/18 Lyrica 200 mg # 60 prescribed by me  12/24/18 Ambien 5 mg $ 30 prescribed by Psychiatry( Jennifer Shane)  11/30/18 Lyrica 200 mg # 60 by me  11/14/18 Robitussin/Codeine 120 cc by me  Other entries in report( 3 prescribers)  -----------------------------------------------------------------------------    Established with  Cardiology- DR Miranda  Sleep Center- Dr Rajan  Psychiatry- Jennifer Shane.  Endocrine- [place]  [facility]-[place]  [facility]ph[facility]     Review of Records:  12/28/18 GFR= 48, slight improvement, UA= Normal, BS= 127, Vit D= 28(low, but improved from previous Vit D= 9))  12/28/18 U/S Renal= NORMAL  12/21/18 Cardiology- DR Miranda for Hyperlipidemia, PVD/Claudication.---> Continue Lipitor 80 mg/day, Stopl Gemfibrozil.  Echocardiogram ordered.   12/13/18 Clinic appt w Dr Arreola for neck and leg pain, Lasix RX, Referred to Pain Management---> Per Referral Dept, she is established  w [place] [facility] and just needs to call and make an appt.   12/3/18 Sleep Center- DR Cipriano Rajan- MAGNOLIA, Hypersomnolence improving w CPAP.  11/14/18 " "Clinic for f/u on Chronic Pain, Med Refills. RX Doxycycline,Robitussin/codeine for cough.  10/30/18 Telep Encounter regarding Psych Meds  To get from Psychiatry as discussed.     10/1/18 Tele. Encout--- Stop Statin, continue Gemfibrozil, to see Cardiology about lipids.     10/10/18 Clinic visit for RTC paperwork, F/u on pain.  Flexeril as Robaxin back ordered.  10/4/18 Endocrine C Lynn TInezE. GFR reduced, Patient to decrease Metformin  To 500 mg/day.  10/4/18 DR Ortiz Nephrology appt. Start on Vit D, Avoid NSAID's  10/1/18 T.E for Norma to obtain Psych meds from Psychiatry(CBA)  9/28/18 Labs-   CBC normal, CMP normal except BS= 170, A1C= 8.0, GFR= 49/41  Cholesterol Total= 264, Kzlxnvw=493, HDL= 50, LDL=160,  TSH= 1.23  Vit B12 normal, Vit D= 9 (low)  C-pepitide= 6.2  9/27/18 Endocrine appt w Lynn ROMERO.  9/11/18 F/u on multiple concerns. REferred to Ophthalmology, Endocrine, Podiatry.  To continue w [facility] and also w her Cardiologist.  RX Buspar, Continue other Psych meds, To see Psychiatrist as planned on 9/25/18 and   RX Doxycycline and Bactroban.  9/11/18NO BLOOD WORK COMPLETED BY PATIENT   Upcoming appts\"  12/3/18 Pulmonary for Sleep Study  10/4/18 Nephrology r/t CKD  9/25 Psychiatry- Dr Khan  9/20 GYN  9/20/18 Cardiology r/t CAD, HTN.  8/29/18 [facility] -1st appt  8/28/18 Mammogram normal  8/10/18 Fit Stool Test= Negative  8/8/18 Clinic for New Patient appt, Multiple concerns/issues. ---> Labs ordered. Fit Test Ordered,  ---->At new patient Clinic Visit - Multiple Referrals placed:  GYN  Psychiatry  Psychology  Nephrology  Cardiology  Pain Clinic  Sleep STudy.  GI for hx of colon polyps    Uncontrolled type 2 diabetes mellitus with kidney complication, with long-term current use of insulin (HCC)  Hx of poorly controleed DM-2 w resulting Chronic Kidney disease.  Needs refill of Metformin and Long Acting and S/S insulin.,  Reports is eating \"healthier\".  Discussed importance of " "reducing intake of sugar/carbs in diet.      Mixed hyperlipidemia  Recently saw Cardiology  INstructed to continue Lipitor 80 mg/day and stop Gemfibrozil.  Hx of high Total and high LDL Cholesterol, high Triglycerides.  Denies any Myalgias    Vitamin D deficiency  Recent Vitamin D labs showing improvement  Is taking weekly Vitamin D RX supplement:  Results for WILBERT REESE (MRN [id number]) as of 1/7/2019 11:08   Ref. Range 9/28/2018 10:01 12/28/2018 11:16   25-Hydroxy   Vitamin D 25 Latest Ref Range: 30 - 100 ng/mL 9 (L) 28 (L)   Recommend she continue her weekly Vit D supplement but she has run out.  Will refill Weekly RX and we discussed her improvement but need for ongoing  Vitamin D supplementation.    Right wrist pain  Reports was walking her dog and had pull from leash to right wrist.  Has some ongoing right wrist discomfort x 1 month.  Has velcro splint from otc store.   Had numb fingers for a week. Now can move fingers better.  Mild discomfort continues. Denies current swelling.    Discussed addition of small Ace wrap for a little more stability/support,  For her to ice wrist every night. To not wear wrist splint or ace wrap too tight  And to remove rings from fingers.    Stomach ache/Headache resolved.  Has had \"stomach flu\" at home w multiple family members.  Headache, nausea and vomiting.  States has been a mild epigastric pain  And \"bad acid\" in area  States Omeprazole \"never helped\" Reports in past  Zantac did not help either. IN past Protonix helped.  Asking for Protonix RX. We discussed it may not be covered by her insurance    Discussed trial of Protonix and Carafate.    Chronic pain syndrome  Has had neck and low back pain and \"for all my pains\".  \"eased it up all over\"  Tizanidine helped \"alot\"  Was taking  Them TID.  Ran out about 2 weeks.    Recommend I wrist 2 Rx hard copies for 2 months worth of Lyrica and for her to  Make appt at [place] [facility] for Ongoing Pain " management.    Psychiatric disorder  I staking Remeron, Venlafaxine, Hydroxyzine, Buspar, Seroquel by Psychiatry- Jennifer Shane.  Denies suicidal ideation.    Medication management  Patient has a list of her medications and is asking for us to review so I can refill all that are appropriate.  She is getting all her Psychiatry Meds from Jennifer Shane(Psychiatry).  Recently stopped Gemfibrozil per Cardiology.    We reviewed her list against our computer list and updated it.  Most of her medications refilled today.    Stage 3 chronic kidney disease  WE discussed her slightly improved kidney function per recent lab tests.  She reports she is not having issues w urination.  I strongly recommended she keep f/u appt's with Her Nephrologist.  She can not remember their name but has info at home.        Patient Active Problem List    Diagnosis Date Noted   • Headache, unspecified headache type 01/07/2019   • Stomach ache 01/07/2019   • Right wrist pain 01/07/2019   • Vaginal cyst 11/14/2018   • Cough 11/14/2018   • Vitamin D deficiency 10/10/2018   • Assistance needed with transportation 10/10/2018   • Microalbuminuria due to type 2 diabetes mellitus ([facility]) 10/04/2018   • Neuropathy ([facility]) 09/27/2018   • Fatigue 09/27/2018   • Pain in both feet 09/11/2018   • Poor vision 09/11/2018   • Skin infection 09/11/2018   • Medication management 08/08/2018   • Uncontrolled type 2 diabetes mellitus with kidney complication, with long-term current use of insulin ([facility]) 08/08/2018   • Colon polyps 08/08/2018   • Mixed hyperlipidemia 08/08/2018   • Tobacco abuse 08/08/2018   • Psychiatric disorder 08/08/2018   • Hot flashes 08/08/2018   • Other sleep apnea 08/08/2018   • Stage 3 chronic kidney disease (HCC) 08/08/2018   • CAD, multiple vessel 08/08/2018   • Chronic pain syndrome 08/08/2018       Allergies:Imitrex [sumatriptan]; Baclofen; Ees [erythromycin]; and Erythromycin [akne-mycin]    Medicines as of today:  Current Outpatient  "Prescriptions   Medication Sig Dispense Refill   • ondansetron (ZOFRAN ODT) 4 MG TABLET DISPERSIBLE Take 1 Tab by mouth every 6 hours as needed for Nausea. 20 Tab 1   • pantoprazole (PROTONIX) 20 MG tablet Take 1 Tab by mouth every day. 30 Tab 2   • sucralfate (CARAFATE) 1 GM Tab Take 1 Tab by mouth 4 Times a Day,Before Meals and at Bedtime. 60 Tab 1   • tizanidine (ZANAFLEX) 4 MG Tab Take 1 Tab by mouth every 6 hours as needed. 120 Tab 2   • potassium chloride SA (K-DUR) 10 MEQ Tab CR Take 1 Tab by mouth 2 times a day. TAKE 1 TABLET BY MOUTH TWICE A DAY 60 Tab 2   • levetiracetam (KEPPRA) 1000 MG tablet Take 1.5 Tabs by mouth 2 Times a Day. 90 Tab 2   • AGGRENOX  MG CAPSULE SR 12 HR Take 1 Cap by mouth 2 times a day. 60 Cap 2   • topiramate (TOPAMAX) 50 MG tablet Take 1 Tab by mouth 2 times a day. TAKE 1 TABLET BY MOUTH TWICE A DAY 60 Tab 2   • furosemide (LASIX) 40 MG Tab Take 1 Tab by mouth every day. 30 Tab 2   • metFORMIN (GLUCOPHAGE) 500 MG Tab Take 1 Tab by mouth 2 times a day, with meals. 180 Tab 0   • metoprolol (LOPRESSOR) 25 MG Tab Take 1 Tab by mouth every day. 90 Tab 0   • pregabalin (LYRICA) 200 MG capsule Take 1 Cap by mouth 2 times a day for 30 days. 60 Cap 0   • [START ON 2/7/2019] pregabalin (LYRICA) 200 MG capsule Take 1 Cap by mouth 2 times a day for 30 days. 60 Cap 0   • insulin glargine (LANTUS) 100 UNIT/ML Solution Inject 80 Units as instructed at bedtime as needed. 20 mL 2   • insulin lispro (HUMALOG) 100 UNIT/ML Solution Inject 40 Units as instructed 3 times a day before meals. 20 mL 2   • Lancets Lancets covered by insurance, to use 4x a day 200 Each 2   • ergocalciferol (DRISDOL) 42540 UNIT capsule Take 1 Cap by mouth every 7 days. 4 Cap 11   • Insulin Syringe-Needle U-100 (INSULIN SYRINGE .5CC/31GX5/16\") 31G X 5/16\" 0.5 ML Misc To use with long acting and slid scale insulin up to 4x per day 150 Each 2   • busPIRone (BUSPAR) 30 MG tablet TAKE 1 TABLET BY MOUTH TWICE A DAY  0   • " venlafaxine (EFFEXOR-XR) 150 MG extended-release capsule      • atorvastatin (LIPITOR) 80 MG tablet Take 1 Tab by mouth every evening. 30 Tab 11   • ranitidine (ZANTAC) 300 MG tablet TAKE 1 TAB BY MOUTH EVERY DAY. TAKE 1 TABLET BY MOUTH TWICE A DAY 60 Tab 0   • hydrOXYzine HCl (ATARAX) 10 MG Tab Take 1 Tab by mouth 2 times a day as needed for Itching. 60 Tab 1   • ONE TOUCH LANCETS Misc Test bld sugar 4x per day w lancets 200 Each 2   • mirtazapine (REMERON) 45 MG tablet TAKE 1 TAB BY MOUTH 1 TIME DAILY AS NEEDED. 30 Tab 0   • Cranberry 1000 MG Cap Take 1,000 mg by mouth every day.     • quetiapine (SEROQUEL) 400 MG tablet Take 2 Tabs by mouth every bedtime. 60 Tab 1   • doxycycline (VIBRAMYCIN) 100 MG Tab TAKE 1 TABLET BY MOUTH TWICE A DAY  0     No current facility-administered medications for this visit.        Social History   Substance Use Topics   • Smoking status: Current Every Day Smoker     Packs/day: 2.00     Years: 37.00     Types: Cigarettes   • Smokeless tobacco: Never Used      Comment: started smoking at age 22    • Alcohol use No       Past Medical History:   Diagnosis Date   • Anxiety    • Asthma    • Back pain    • Bipolar affective disorder (HCC)    • Bronchitis    • CHF (congestive heart failure) ([facility])    • Chickenpox    • CKD (chronic kidney disease) stage 3, GFR 30-59 ml/min (HCC)    • Colon polyp    • COPD (chronic obstructive pulmonary disease) (HCC)    • Depression    • Diabetes (HCC)    • Heart attack (HCC)    • Hyperlipidemia    • Kidney disease    • Kidney stone    • Muscle disorder    • Obesity    • Pneumonia    • Pulmonary embolism (HCC)    • Seizure ([facility])    • Sleep apnea        Family History   Problem Relation Age of Onset   • Heart Disease Mother    • Hypertension Mother    • Diabetes Father    • Cancer Maternal Grandfather    • Diabetes Paternal Grandmother    • Diabetes Paternal Grandfather    • Lung Disease Sister    • Cancer Son        ROS:  Review of Systems   See HPI  "Above    Exam:  Blood pressure 100/70, pulse 76, temperature 36.6 °C (97.9 °F), temperature source Temporal, resp. rate 16, height 1.651 m (5' 5\"), weight 105.7 kg (233 lb), SpO2 93 %. Body mass index is 38.77 kg/m².    General:  Well nourished, over-weight,well developed female in mild discomfort.  HENT:Head is grossly normal. PERRL.  Neck: Supple. Trachea is midline.  Pulmonary: Clear to ausculation .  Normal effort. No rales, ronchi, or wheezing.   Cardiovascular: Regular rate and rhythm.  Abdomen-Abdomen is soft, No tenderness.  Upper extremities- Strong = . Good ROM, slight tenderness over radial aspect of right wrist.  No discoloration or swelling noted. Good Capillary Refill to fingers.  Lower extremities- neg for edema, redness, tenderness.  Neuro- A & O x 4. Speech clear and appropriate.    Current medications, allergies, and problem list reviewed with patient and updated in [facility] today.    Assessment/Plan:  1. Uncontrolled type 2 diabetes mellitus with kidney complication, with long-term current use of insulin ([facility])    Insulin Syringes 8 mm,31 ga, 0.5 cc # 150    Lancets   2. Mixed hyperlipidemia  Continue Atorvastatin 80 mg/day per Cardiology   3. Vitamin D deficiency  ergocalciferol (DRISDOL) 06602 UNIT capsule   4. Headache, unspecified headache type  Tylenol prn   5. Stomach ache  pantoprazole (PROTONIX) 20 MG tablet  ( Omeprazole or Ranitidine did not help her)    sucralfate (CARAFATE) 1 GM Tab   6. Right wrist pain  Rest, 4\" ace wrap to wrist. Instructions of use discussed.   7. Gastroesophageal reflux disease with esophagitis  pantoprazole (PROTONIX) 20 MG tablet    sucralfate (CARAFATE) 1 GM Tab   8. Nausea  ondansetron (ZOFRAN ODT) 4 MG TABLET DISPERSIBLE   9. Chronic pain syndrome  tizanidine (ZANAFLEX) 4 MG Tab    pregabalin (LYRICA) 200 MG capsule( Jan--> Feb)    pregabalin (LYRICA) 200 MG capsule( Feb- March)   10. Psychiatric disorder  Continue Psychiatry Meds per Jennifer Shane-Psychiatry( " Seroquel, Buspar, Venalfaxine, Remeron, Hydroxyzine)   11. Essential hypertension  potassium chloride SA (K-DUR) 10 MEQ Tab CR    furosemide (LASIX) 40 MG Tab    metoprolol (LOPRESSOR) 25 MG Tab   12. Seizure disorder (HCC)  levetiracetam (KEPPRA) 1000 MG tablet   13. CAD, multiple vessel  AGGRENOX  MG CAPSULE SR 12 HR   14. Fibromyalgia  topiramate (TOPAMAX) 50 MG tablet    pregabalin (LYRICA) 200 MG capsule    pregabalin (LYRICA) 200 MG capsule   15. Uncontrolled type 2 diabetes mellitus without complication, without long-term current use of insulin (HCC)  metFORMIN (GLUCOPHAGE) 500 MG Tab    insulin glargine (LANTUS) 100 UNIT/ML Solution    insulin lispro (HUMALOG) 100 UNIT/ML Solution   16. Medication management  Meds reviewed and updated, refills completed'   17. Stage 3 chronic kidney disease (HCC)  To f/u w her Nephrologist, Avoid NSAID's as discussed.       Return in about 8 weeks (around 3/4/2019) for Pain Med F/U, multiple issue f/u.

## 2019-01-07 NOTE — ASSESSMENT & PLAN NOTE
Reports was walking her dog and had pull from leash to right wrist.  Has some ongoing right wrist discomfort x 1 month.  Has velcro splint from otc store.   Had numb fingers for a week. Now can move fingers better.

## 2019-01-07 NOTE — ASSESSMENT & PLAN NOTE
Patient has a list of her medications and is asking for us to review so I can refill all that are appropriate.  She is getting all her Psychiatry Meds from Jennifer Shane(Psychiatry).  Recently stopped Gemfibrozil per Cardiology.    We reviewed her list against our computer list and updated it.  Most of her medications refilled today.

## 2019-01-07 NOTE — ASSESSMENT & PLAN NOTE
"Has had neck and low back pain and \"for all my pains\".  \"eased it up all over\"  Tizanidine helped \"alot\"  Was taking  Them TID.  Ran out about 2 weeks.  "

## 2019-01-07 NOTE — ASSESSMENT & PLAN NOTE
I staking Remeron, Venlafaxine, Hydroxyzine, Buspar, Seroquel by Psychiatry- Jennifer Shane.  Denies suicidal ideation.

## 2019-01-07 NOTE — ASSESSMENT & PLAN NOTE
WE discussed her slightly improved kidney function per recent lab tests.  She reports she is not having issues w urination.  I strongly recommended she keep f/u appt's with Her Nephrologist.  She can not remember their name but has info at home.

## 2019-01-08 ENCOUNTER — TELEPHONE (OUTPATIENT)
Dept: SLEEP MEDICINE | Facility: MEDICAL CENTER | Age: 59
End: 2019-01-08

## 2019-01-08 DIAGNOSIS — G47.33 OSA (OBSTRUCTIVE SLEEP APNEA): ICD-10-CM

## 2019-01-08 NOTE — TELEPHONE ENCOUNTER
[address] [id number] 
[phone] Patient: Anderson Galvan MRN: [id number] M:[birth year] Visit Information Date & Time Provider Department Dept. Phone Encounter #  
 3/28/2018  2:00 PM Dinora Liao MD [address] [facility] [phone] [id number] Follow-up Instructions Return in about 2 weeks (around 4/11/2018), or if symptoms worsen or fail to improve. Your Appointments 4/27/2018  8:30 AM  
ROUTINE CARE with Dinora Liao MD  
[facility] - [address] [place] [address] ([facility]) Appt Note: 3 mo fu  
 [address] [place] [id number]  
[phone]  
  
   
 [address] [place] [id number] Upcoming Health Maintenance Date Due FOBT Q 1 YEAR AGE 50-75 2/2/2018 EYE EXAM RETINAL OR DILATED Q1 4/7/2018 Pneumococcal 19-64 Medium Risk (1 of 1 - PPSV23) 5/25/2018* HEMOGLOBIN A1C Q6M 7/26/2018 MICROALBUMIN Q1 7/26/2018 FOOT EXAM Q1 1/26/2019 LIPID PANEL Q1 1/26/2019 DTaP/Tdap/Td series (2 - Td) 10/26/2025 *Topic was postponed. The date shown is not the original due date. Allergies as of 3/28/2018  Review Complete On: 3/28/2018 By: Nahed Rogers LPN No Known Allergies Current Immunizations  Reviewed on 10/26/2017 Name Date Influenza Vaccine (Quad) PF 10/26/2017, 10/26/2015 Tdap 10/26/2015 Not reviewed this visit You Were Diagnosed With   
  
 Codes Comments Acute cystitis without hematuria    -  Primary ICD-10-CM: N30.00 ICD-9-CM: 595.0 Controlled type 2 diabetes mellitus without complication, without long-term current use of insulin ([facility] [address]     ICD-10-CM: E11.9 ICD-9-CM: 250.00 Vitals BP Pulse Temp Resp Height(growth percentile) Weight(growth percentile) 114/70 74 99 °F (37.2 °C) (Oral) 16 5' 10\" (1.778 m) 160 lb (72.6 kg) SpO2 BMI Smoking Status DOCUMENTATION OF PAR STATUS:    1. Name of Medication & Dose: Pantoprazole      2. Name of Prescription Coverage Company & phone #: Medicaid FFS    3. Date Prior Auth Submitted: 1/7/19    4. What information was given to obtain insurance decision? Office notes, Med Hx, Dx    5. Prior Auth Status? Pending    6. Patient Notified: yes       98% 22.96 kg/m2 Current Every Day Smoker Vitals History BMI and BSA Data Body Mass Index Body Surface Area  
 22.96 kg/m 2 1.89 m 2 Preferred Pharmacy Pharmacy Name Phone [facility] [phone] Your Updated Medication List  
  
   
This list is accurate as of 3/28/18  2:17 PM.  Always use your most recent med list. amLODIPine 5 mg tablet Commonly known as:  Alexandria Catracho TAKE ONE TABLET BY MOUTH DAILY  
  
 aspirin delayed-release 81 mg tablet Take 1 Tab by mouth daily. AZOPT 1 % ophthalmic suspension Generic drug:  brinzolamide INSTILL 1 DROP INTO BOTH EYES BID  
  
 ciprofloxacin HCl 500 mg tablet Commonly known as:  CIPRO Take 1 Tab by mouth two (2) times a day for 7 days. dorzolamide-timolol 22.3-6.8 mg/mL ophthalmic solution Commonly known as:  COSOPT  
  
 glipiZIDE 10 mg tablet Commonly known as:  GLUCOTROL  
TAKE ONE TABLET BY MOUTH TWICE A DAY  
  
 lisinopril 10 mg tablet Commonly known as:  PRINIVIL, ZESTRIL  
TAKE ONE TABLET BY MOUTH DAILY  
  
 metFORMIN 1,000 mg tablet Commonly known as:  GLUCOPHAGE  
TAKE ONE TABLET BY MOUTH TWICE A DAY WITH MEALS  
  
 pioglitazone 30 mg tablet Commonly known as:  ACTOS  
TAKE ONE TABLET BY MOUTH ONCE NIGHTLY  
  
 TRAVATAN Z 0.004 % ophthalmic solution Generic drug:  travoprost  
  
  
  
  
Prescriptions Sent to Pharmacy Refills  
 ciprofloxacin HCl (CIPRO) 500 mg tablet 0 Sig: Take 1 Tab by mouth two (2) times a day for 7 days. Class: Normal  
 Pharmacy: [facility] Ph #: [phone] Route: Oral  
  
We Performed the Following AMB POC GLUCOSE BLOOD, BY GLUCOSE MONITORING DEVICE [12206 CPT(R)] AMB POC URINALYSIS DIP STICK AUTO W/O MICRO [29479 CPT(R)] Follow-up Instructions Return in about 2 weeks (around 2018), or if symptoms worsen or fail to improve. Patient Instructions [email or web address] Activation Thank you for requesting access to [employer]. Please follow the instructions below to securely access and download your online medical record. [employer] allows you to send messages to your doctor, view your test results, renew your prescriptions, schedule appointments, and more. How Do I Sign Up? 1. In your internet browser, go to [email or web address] 
2. Click on the First Time User? Click Here link in the Sign In box. You will be redirect to the New Member Sign Up page. 3. Enter your [employer] Access Code exactly as it appears below. You will not need to use this code after youve completed the sign-up process. If you do not sign up before the expiration date, you must request a new code. [employer] Access Code: Activation code not generated Current [employer] Status: Patient Declined (This is the date your [employer]visor access code will ) 4. Enter the last four digits of your Social Security Number (xxxx) and Date of Birth (mm/dd/yyyy) as indicated and click Submit. You will be taken to the next sign-up page. 5. Create a [employer]visor ID. This will be your [employer] login ID and cannot be changed, so think of one that is secure and easy to remember. 6. Create a [employer] password. You can change your password at any time. 7. Enter your Password Reset Question and Answer. This can be used at a later time if you forget your password. 8. Enter your e-mail address. You will receive e-mail notification when new information is available in [address] 9. Click Sign Up. You can now view and download portions of your medical record. 10. Click the Download Summary menu link to download a portable copy of your medical information. Additional Information If you have questions, please visit the Frequently Asked Questions section of the [employer] website at [email or web address] Remember, [facility] is NOT to be used for urgent needs. For medical emergencies, dial 911. Please provide this summary of care documentation to your next provider. Your primary care clinician is listed as Segundo Kendrick. If you have any questions after today's visit, please call [phone].

## 2019-01-09 NOTE — TELEPHONE ENCOUNTER
Pt called the SC stating she spoke to Casey at [facility] regarding her order for a new cpap that was placed by Dr. Rajan back on 12/3/18. Pt stated that preferred needs documentation on why the pt machine needs to be replace. I informed the pt I would contact casey to see what exactly he needs.    Called Casey and he stated to me that on Dr. Rajan OV he needs to state the reason why the pt machine is being replace. For example the machine malfunction, not repairable or the machine is out of warranty.  After that the pt is able to receive the machine.

## 2019-01-09 NOTE — TELEPHONE ENCOUNTER
FINAL PRIOR AUTHORIZATION STATUS:    1.  Name of Medication & Dose: Pantoprazole     2. Prior Auth Status: Denied.  Reason: Pt is not on a sucralfate or h2 antagonist.    3. Action Taken: Pharmacy Notified: yes Patient Notified: yes

## 2019-01-09 NOTE — TELEPHONE ENCOUNTER
Dr. Rajan is out of the office for another month but his note states her CPAP is 7+ years old and needs replacement.    I'll follow up with Casey or [facility] to see if they can just accept that.

## 2019-01-09 NOTE — TELEPHONE ENCOUNTER
Per Medicaid guidelines, Pt will need a new SS and also to document specifically why it is needing to be replaced.  Over 5 years old is not a good reason for them to approve.

## 2019-01-10 ENCOUNTER — TELEPHONE (OUTPATIENT)
Dept: MEDICAL GROUP | Facility: MEDICAL CENTER | Age: 59
End: 2019-01-10

## 2019-01-10 NOTE — TELEPHONE ENCOUNTER
No she is not to take Methocarbamol ( muscle relaxant)  She said she was doing well on Tizanidine so we refilled that muscle relaxant.  Casey

## 2019-01-11 NOTE — TELEPHONE ENCOUNTER
Pt is ok with repeating testing and FV, but would like an order for mask and supplies in the mean time until we can get her the new CPAP.

## 2019-01-14 ENCOUNTER — PHYSICAL THERAPY (OUTPATIENT)
Dept: PHYSICAL THERAPY | Facility: REHABILITATION | Age: 59
End: 2019-01-14
Attending: FAMILY MEDICINE
Payer: MEDICAID

## 2019-01-14 DIAGNOSIS — M54.2 NECK PAIN: ICD-10-CM

## 2019-01-14 DIAGNOSIS — G89.4 CHRONIC PAIN SYNDROME: ICD-10-CM

## 2019-01-14 PROCEDURE — 97110 THERAPEUTIC EXERCISES: CPT

## 2019-01-14 PROCEDURE — 97014 ELECTRIC STIMULATION THERAPY: CPT

## 2019-01-14 PROCEDURE — 97112 NEUROMUSCULAR REEDUCATION: CPT

## 2019-01-14 NOTE — OP THERAPY DAILY TREATMENT
"  Outpatient Physical Therapy  DAILY TREATMENT     [facility] Physical [address].  [address]  [place] [address]  Phone:  [phone]  Fax:  [phone]    Date: 01/14/2019    Patient: Norma Jaimes  YOB: 1960  MRN: [id number]     Time Calculation  Start time: 0955  Stop time: 1046 Time Calculation (min): 51 minutes     Chief Complaint: Neck Pain    Visit #: 2    SUBJECTIVE:  Pt arrives with R wrist wrapped, states the doctor told her to keep it braced for 2 weeks.  Still using cane in L hand.  Pt states she does the HEP daily, but it doesn't seem to help.  Her neck is still very painful, her back and L hip and B knees hurt.  Nothing seems to help.  Pt asks, \"why do I hurt so much?\"    OBJECTIVE:          Therapeutic Exercises (CPT 71291):     1. NuStep, Level 1, 2min x2    2. Ankle pumps, x10B    3. LAQ, x10B    4. L sidelying clams, x6    5. Scap retraction, x5      Therapeutic Exercise Summary: Add sidelying clams and SAQ to HEP.    Therapeutic Treatments and Modalities:     1. Neuromuscular Re-education (CPT 86833), Desensitization to B cervical paraspinals and upper traps, GrI lateral glides C6-7 for neural mobility.    2. E Stim Unattended (CPT 37345), IFC and moist heat to C/S and upper traps.  x10 min, Pt requested to stop prior to 15 min due to uncomfortable in hooklying position with bolster under legs for this amount of time.    Therapeutic Treatment and Modalities Summary: PROM B shoulders with emphasis on abd/flex and IR/ER for neural mobility.  Pt reports it feels like her shoulders are going to \"pop out\" with reaching overhead in supine.  Education re: hypersensitive nervous system allowing for very little movement/activity prior to sending danger signals to brain.  Sleep, exercise, stress management and other factors can increase or decrease the sensitivity of the system.  Provided the \"Why Do I Hurt\" workbook.  Used graph images in book to further " explain and discuss sensitivity and decreased tolerance for activity.    Time-based treatments/modalities:  Therapeutic exercise minutes (CPT 83413): 15 minutes  Neuromusc re-ed, balance, coor, post minutes (CPT 45423): 15 minutes     ASSESSMENT:   Response to treatment: Pt very sensitive to touch and movement.  Limited tolerance for therex and hands on neural mobility.      PLAN/RECOMMENDATIONS:   Plan for treatment: therapy treatment to continue next visit.  Planned interventions for next visit: continue with current treatment.

## 2019-01-16 ENCOUNTER — APPOINTMENT (OUTPATIENT)
Dept: NEPHROLOGY | Facility: MEDICAL CENTER | Age: 59
End: 2019-01-16
Payer: MEDICAID

## 2019-01-16 ENCOUNTER — APPOINTMENT (OUTPATIENT)
Dept: PHYSICAL THERAPY | Facility: REHABILITATION | Age: 59
End: 2019-01-16
Attending: FAMILY MEDICINE
Payer: MEDICAID

## 2019-01-21 ENCOUNTER — PHYSICAL THERAPY (OUTPATIENT)
Dept: PHYSICAL THERAPY | Facility: REHABILITATION | Age: 59
End: 2019-01-21
Attending: FAMILY MEDICINE
Payer: MEDICAID

## 2019-01-21 DIAGNOSIS — G89.4 CHRONIC PAIN SYNDROME: ICD-10-CM

## 2019-01-21 DIAGNOSIS — M54.2 NECK PAIN: ICD-10-CM

## 2019-01-21 PROCEDURE — 97112 NEUROMUSCULAR REEDUCATION: CPT

## 2019-01-21 NOTE — OP THERAPY DAILY TREATMENT
"  Outpatient Physical Therapy  DAILY TREATMENT     [facility] Physical Therapy [address]  [address]  [place] [address]  Phone:  [phone]  Fax:  [phone]    Date: 01/21/2019    Patient: Norma Jaimes  YOB: 1960  MRN: [id number]     Time Calculation  Start time: 0948  Stop time: 1025 Time Calculation (min): 37 minutes     Chief Complaint: Neck Pain    Visit #: 3    SUBJECTIVE:  Pt reports she doesn't feel like this is helping.  Neck is very painful and tight, it just gets worse.  Reports some relief after PT stretches her neck, but then it comes back and hurts even more than prior to PT visit.  Also feels like EStim was too much last time, and made her hurt more as well.  States she does scap squeezes and tries to do nose to shoulder stretch at home, although that one really hurts.  Concerned that the muscles (rubbing upper traps) are always swollen and tight.  Pt reports she takes a pill to sleep at night.  She takes it around 8 pm, and gets to sleep between 9 and 10.  Still wakes around 3 or 4 am.  Always feels tired, but she can't fall back to sleep without the sleeping med.  Sometimes lies down during the day, just to rest and close her eyes.    OBJECTIVE:        Therapeutic Exercises (CPT 17516):     1. Scap retraction, x10    2. Seated chin tuck, x5, very small ROM due to pain in subocc area and neck.  Also reports painful  \"popping\" in neck.     3. NuStep, Level 1, 2.5 min, rest, 2 min    Therapeutic Treatments and Modalities:     1. Neuromuscular Re-education (CPT 26936), neck and upper thoracic    Therapeutic Treatment and Modalities Summary: Desensitization to upper T/S paraspinals, prone gapping T2-T5 B, GrI.  Painful throughout.  Therapeutic Pain Neuroscience education provided: Stressed importance of sleep hygiene and aerobic exercise.  Instructed pt to keep room dark and eyes closed and just rest, even though she may be awake very early in the morning.  " "Try staying in bed 15 extra minutes this week, and increase 15 min every few days or a week.  Also, start daily aerobic exercise.  Set timer for 2 or 2.5 min and walk around house for that period of time.  Rest, then do another 2 min.  Reviewed importance of space, movement, and blood for nervous system, and that aerobic exercise provides this.  Educated pt re: sensitive nervous system sending alarms with very little movement or activity, although there is not any injury or damage to tissues occurring.      Time-based treatments/modalities:          ASSESSMENT:   Discussed overactivity of upper traps and neighboring cervico-scapular muscles, and postural dysfunction.  Encouraged pt to be conscious of posture throughout the day and avoid those \"swollen muscles\" with all activities.    Response to treatment: Pt continues to have significant pain limitations in activity tolerance.    PLAN/RECOMMENDATIONS:   Plan for treatment: therapy treatment to continue next visit.  Planned interventions for next visit: continue with current treatment.      "

## 2019-01-23 ENCOUNTER — APPOINTMENT (OUTPATIENT)
Dept: PHYSICAL THERAPY | Facility: REHABILITATION | Age: 59
End: 2019-01-23
Attending: FAMILY MEDICINE
Payer: MEDICAID

## 2019-01-27 ENCOUNTER — APPOINTMENT (OUTPATIENT)
Dept: RADIOLOGY | Facility: MEDICAL CENTER | Age: 59
End: 2019-01-27
Attending: EMERGENCY MEDICINE
Payer: MEDICAID

## 2019-01-27 ENCOUNTER — HOSPITAL ENCOUNTER (EMERGENCY)
Facility: MEDICAL CENTER | Age: 59
End: 2019-01-27
Attending: EMERGENCY MEDICINE
Payer: MEDICAID

## 2019-01-27 VITALS
WEIGHT: 235.67 LBS | RESPIRATION RATE: 18 BRPM | HEIGHT: 63 IN | BODY MASS INDEX: 41.76 KG/M2 | OXYGEN SATURATION: 92 % | SYSTOLIC BLOOD PRESSURE: 122 MMHG | TEMPERATURE: 97.2 F | HEART RATE: 95 BPM | DIASTOLIC BLOOD PRESSURE: 61 MMHG

## 2019-01-27 DIAGNOSIS — K21.00 GASTROESOPHAGEAL REFLUX DISEASE WITH ESOPHAGITIS: ICD-10-CM

## 2019-01-27 DIAGNOSIS — S63.501A SPRAIN OF RIGHT WRIST, INITIAL ENCOUNTER: ICD-10-CM

## 2019-01-27 PROCEDURE — 99283 EMERGENCY DEPT VISIT LOW MDM: CPT

## 2019-01-27 PROCEDURE — 73110 X-RAY EXAM OF WRIST: CPT | Mod: RT

## 2019-01-27 PROCEDURE — 73130 X-RAY EXAM OF HAND: CPT | Mod: RT

## 2019-01-27 NOTE — ED TRIAGE NOTES
Chief Complaint   Patient presents with   • Hand Pain     Right    • Wrist Pain     Right    Pt to triage in NAD.  Pt reports right hand/wrist pain x 2 months.  Pt reports she was walking her dog and the leash wrapped around hand/wrist 1.5 months ago.  Pt has velcro hand/wrist splint in place.  CMS intact.  Pt educated on triage process and instructed to notify triage RN of any change in status.

## 2019-01-27 NOTE — ED PROVIDER NOTES
ED Provider Note    CHIEF COMPLAINT  Chief Complaint   Patient presents with   • Hand Pain     Right    • Wrist Pain     Right        HPI  Norma Jaimes is a 58 y.o. female who presents for evaluation of wrist and hand pain.  Patient states that approximately month and a half ago she was walking her dog when she had the leash wrapped around the wrist and hand several times.  The dog pulled her forward pulling her hair wrist and hand.  The patient presents here complaining of persistent pain mostly over the ulnar aspect of the right wrist and hand area.  She denies other joint pain or swelling.  She denies any recent: Fever, chills, URI symptoms, cardiorespiratory symptoms, rashes.  No other acute symptomatology or complaints.    REVIEW OF SYSTEMS  See HPI for further details. All other systems negative.    PAST MEDICAL HISTORY  Past Medical History:   Diagnosis Date   • Anxiety    • Asthma    • Back pain    • Bipolar affective disorder (HCC)    • Bronchitis    • CHF (congestive heart failure) (HCC)    • Chickenpox    • CKD (chronic kidney disease) stage 3, GFR 30-59 ml/min (HCC)    • Colon polyp    • COPD (chronic obstructive pulmonary disease) (HCC)    • Depression    • Diabetes (HCC)    • Heart attack (HCC)    • Hyperlipidemia    • Kidney disease    • Kidney stone    • Muscle disorder    • Obesity    • Pneumonia    • Pulmonary embolism (HCC)    • Seizure (HCC)    • Sleep apnea        FAMILY HISTORY  Family History   Problem Relation Age of Onset   • Heart Disease Mother    • Hypertension Mother    • Diabetes Father    • Cancer Maternal Grandfather    • Diabetes Paternal Grandmother    • Diabetes Paternal Grandfather    • Lung Disease Sister    • Cancer Son        SOCIAL HISTORY  Positive tobacco use; no alcohol or drugs;    SURGICAL HISTORY  Past Surgical History:   Procedure Laterality Date   • ABDOMINAL HYSTERECTOMY TOTAL     • APPENDECTOMY     • CARPAL TUNNEL RELEASE     • COLONOSCOPY      history of  "colon    • HYSTERECTOMY LAPAROSCOPY     • INTUBATION     • SINUSCOPE     • VENTILATOR CONTINUOUS         CURRENT MEDICATIONS  Home Medications     Reviewed by Imelda Riley R.N. (Registered Nurse) on 01/27/19 at 0929  Med List Status: <None>   Medication Last Dose Status   AGGRENOX  MG CAPSULE SR 12 HR  Active   atorvastatin (LIPITOR) 80 MG tablet  Active   busPIRone (BUSPAR) 30 MG tablet  Active   Cranberry 1000 MG Cap  Active   doxycycline (VIBRAMYCIN) 100 MG Tab  Active   ergocalciferol (DRISDOL) 35665 UNIT capsule  Active   furosemide (LASIX) 40 MG Tab  Active   hydrOXYzine HCl (ATARAX) 10 MG Tab  Active   insulin glargine (LANTUS) 100 UNIT/ML Solution  Active   insulin lispro (HUMALOG) 100 UNIT/ML Solution  Active   Insulin Syringe-Needle U-100 (INSULIN SYRINGE .5CC/31GX5/16\") 31G X 5/16\" 0.5 ML Misc  Active   Lancets  Active   levetiracetam (KEPPRA) 1000 MG tablet  Active   metFORMIN (GLUCOPHAGE) 500 MG Tab  Active   metoprolol (LOPRESSOR) 25 MG Tab  Active   mirtazapine (REMERON) 45 MG tablet  Active   ondansetron (ZOFRAN ODT) 4 MG TABLET DISPERSIBLE  Active   ONE TOUCH LANCETS Misc  Active   pantoprazole (PROTONIX) 20 MG tablet  Active   potassium chloride SA (K-DUR) 10 MEQ Tab CR  Active   pregabalin (LYRICA) 200 MG capsule  Active   pregabalin (LYRICA) 200 MG capsule  Active   quetiapine (SEROQUEL) 400 MG tablet  Active   ranitidine (ZANTAC) 300 MG tablet  Active   sucralfate (CARAFATE) 1 GM Tab  Active   tizanidine (ZANAFLEX) 4 MG Tab  Active   topiramate (TOPAMAX) 50 MG tablet  Active   venlafaxine (EFFEXOR-XR) 150 MG extended-release capsule  Active                ALLERGIES  Allergies   Allergen Reactions   • Imitrex [Sumatriptan]      palpitations     • Baclofen    • Ees [Erythromycin]    • Erythromycin [Akne-Mycin]        PHYSICAL EXAM  VITAL SIGNS: /61   Pulse 95   Temp 36.2 °C (97.2 °F) (Temporal)   Resp 18   Ht 1.6 m (5' 3\")   Wt 106.9 kg (235 lb 10.8 oz)   SpO2 92%   BMI " 41.75 kg/m²    Constitutional: 58-year-old female, appears older than her stated age,  No acute distress, Non-toxic appearance.   HENT: Normocephalic, Atraumatic, Nares:Clear, Oropharynx: moist, well hydrated, posterior pharynx:clear   Eyes: PERRL, EOMI, Conjunctiva normal, No discharge.   Neck: Normal range of motion, No tenderness, Supple, No stridor.   Lymphatic: No lymphadenopathy noted.   Cardiovascular: Regular rate and rhythm without mumurs, gallups, rubs   Thorax & Lungs: Normal Equal breath sounds, No respiratory distress, No wheezing, no stridor, no rales. No chest tenderness.   Skin: Good skin turgor, pink, warm, dry. No rashes, petechiae, purpura. Normal capillary refill.   Musculoskeletal: Examination of the right upper extremity reveals no tenderness to the shoulder, humerus area, elbow, forearm area; right wrist reveals tenderness diffusely over the dorsal aspect of the wrist especially over the ulnar aspect; there is tenderness extending up toward the ulnar aspect of the hand, decreased range of motion secondary to pain; no associated joint effusion no overlying erythema edema or warmth;  Neurologic: Alert & oriented x 3,  No gross focal deficits noted.         RADIOLOGY/PROCEDURES  DX-WRIST-COMPLETE 3+ RIGHT   Final Result      1.  There is apparent mild swelling on the palmar aspect of the wrist.   2.  There is no acute bony process.      DX-HAND 3+ RIGHT   Final Result      1.  Unremarkable right hand series.            COURSE & MEDICAL DECISION MAKING  Pertinent Labs & Imaging studies reviewed. (See chart for details)  Discussion: At this time, the patient presents 6 weeks after an injury.  The patient has findings consistent with wrist sprain.  This is quite persistent despite the length of time since the initial injury.  The patient is wearing a Velcro wrist splint test and is to continue this.  I will give her follow-up with orthopedic surgery.  She is to use ibuprofen for pain.  I see no  findings to suggest a joint infection or any type of arthritic condition such as gout or rheumatoid arthritis or septic joint.  I discussed the findings and treatment plan with the patient.  She indicates she is comfortable with this explanation and disposition.    FINAL IMPRESSION  1. Sprain of right wrist, initial encounter        PLAN  1.  Appropriate discharge instructions given  2.  Ibuprofen for pain  3.  Follow-up with primary care  4.  Follow-up with orthopedic surgery    Electronically signed by: Guy G Gansert, 1/27/2019 10:44 AM

## 2019-01-28 ENCOUNTER — APPOINTMENT (OUTPATIENT)
Dept: PHYSICAL THERAPY | Facility: REHABILITATION | Age: 59
End: 2019-01-28
Attending: FAMILY MEDICINE
Payer: MEDICAID

## 2019-01-28 RX ORDER — RANITIDINE 300 MG/1
TABLET ORAL
Qty: 60 TAB | Refills: 0 | Status: SHIPPED | OUTPATIENT
Start: 2019-01-28 | End: 2019-02-24 | Stop reason: SDUPTHER

## 2019-01-30 ENCOUNTER — PHYSICAL THERAPY (OUTPATIENT)
Dept: PHYSICAL THERAPY | Facility: REHABILITATION | Age: 59
End: 2019-01-30
Attending: FAMILY MEDICINE
Payer: MEDICAID

## 2019-01-30 DIAGNOSIS — G89.4 CHRONIC PAIN SYNDROME: ICD-10-CM

## 2019-01-30 DIAGNOSIS — M54.2 NECK PAIN: ICD-10-CM

## 2019-01-30 PROCEDURE — 97014 ELECTRIC STIMULATION THERAPY: CPT

## 2019-01-30 PROCEDURE — 97112 NEUROMUSCULAR REEDUCATION: CPT

## 2019-01-30 PROCEDURE — 97110 THERAPEUTIC EXERCISES: CPT

## 2019-01-30 NOTE — OP THERAPY DAILY TREATMENT
"  Outpatient Physical Therapy  DAILY TREATMENT     [facility] Physical Therapy [address].  [address]  [place] [address]  Phone:  [phone]  Fax:  [phone]    Date: 01/30/2019    Patient: Norma Jaimes  YOB: 1960  MRN: [id number]     Time Calculation  Start time: 0938  Stop time: 1025 Time Calculation (min): 47 minutes     Chief Complaint: Neck Problem    Visit #: 4    SUBJECTIVE:  States she has a referral for PT and ortho surgeon for R wrist pain.  Pt reports her knees, back, and neck all hurt about the same.  States when she gets stressed she really feels it.  Reports she does 2 min walk at home as instructed, most days of the week.  Feels ok following 2 min walk, and states she could do 3 min.  States she is sleeping a little better due to using ambien.  States she knows PT has explained pain to her, but she still doesn't understand why she hurts so much, all the time, all over.      OBJECTIVE:        Therapeutic Exercises (CPT 72885):     1. NuStep, Level 1, x2:30, 3 min rest, x2 min, States it feels \"heavier\" today    2. Seated spine flexion/extension, x6    3. Seated spine flexion ball roll on table, x6    4. Scap squeezes with depression, x8    5. Seated row AROM, x8, with cues to avoid shoulder elevation    Therapeutic Treatments and Modalities:     1. Neuromuscular Re-education (CPT 37767), pain neuroscience education/ postural and coordination re-ed    Therapeutic Treatment and Modalities Summary: Education provided on importance of aerobic exercise and sleep.  Discussed aerobic exercise causes brain's release of \"happy chemicals\" to reduce pain signals.  Also aerobic exercise provides blood and movement to entire nervous system, required for healthy nerves.  Encouraged pt to keep at aerobic exercise, HEP, and sleep, and educa that it will take time.  2. EStim Unattended (CPT 21611), IFC and MH to neck and upper traps, x15 min    Time-based " treatments/modalities:  Therapeutic exercise minutes (CPT 72805): 15 minutes  Neuromusc re-ed, balance, coor, post minutes (CPT 41336): 15 minutes     ASSESSMENT:   Response to treatment: Pt limited by widespread chronic pain, deconditioning, and hypersensitive nervous system.  Pt will require continued gradual increase in aerobic exercise and postural re-ed.    PLAN/RECOMMENDATIONS:   Plan for treatment: therapy treatment to continue next visit.  Planned interventions for next visit: continue with current treatment.

## 2019-01-31 ENCOUNTER — OFFICE VISIT (OUTPATIENT)
Dept: NEPHROLOGY | Facility: MEDICAL CENTER | Age: 59
End: 2019-01-31
Payer: MEDICAID

## 2019-01-31 VITALS
OXYGEN SATURATION: 96 % | BODY MASS INDEX: 42.35 KG/M2 | HEIGHT: 63 IN | WEIGHT: 239 LBS | HEART RATE: 82 BPM | RESPIRATION RATE: 16 BRPM | TEMPERATURE: 98 F | SYSTOLIC BLOOD PRESSURE: 128 MMHG | DIASTOLIC BLOOD PRESSURE: 72 MMHG

## 2019-01-31 DIAGNOSIS — N18.30 STAGE 3 CHRONIC KIDNEY DISEASE (HCC): ICD-10-CM

## 2019-01-31 DIAGNOSIS — R80.9 MICROALBUMINURIA DUE TO TYPE 2 DIABETES MELLITUS (HCC): ICD-10-CM

## 2019-01-31 DIAGNOSIS — E55.9 VITAMIN D DEFICIENCY: ICD-10-CM

## 2019-01-31 DIAGNOSIS — E11.29 MICROALBUMINURIA DUE TO TYPE 2 DIABETES MELLITUS (HCC): ICD-10-CM

## 2019-01-31 DIAGNOSIS — I10 ESSENTIAL HYPERTENSION: ICD-10-CM

## 2019-01-31 PROCEDURE — 99214 OFFICE O/P EST MOD 30 MIN: CPT | Performed by: INTERNAL MEDICINE

## 2019-01-31 ASSESSMENT — ENCOUNTER SYMPTOMS
WHEEZING: 1
MYALGIAS: 1
NAUSEA: 0
VOMITING: 0
PALPITATIONS: 0
FEVER: 0
FLANK PAIN: 0
WEIGHT LOSS: 0
BACK PAIN: 1
ORTHOPNEA: 0
SORE THROAT: 0
HEARTBURN: 0
SHORTNESS OF BREATH: 0
ABDOMINAL PAIN: 0
COUGH: 1
CHILLS: 0

## 2019-01-31 NOTE — PROGRESS NOTES
"Subjective:      Norma Jaimes is a 58 y.o. female who presents with Follow-Up            HPI  Norma is coming today for f/u of CKD III  C/o low back pain -no NSAID's  (+) urinary symptoms with hesitancy to urinate  HTN: BP well controlled -compliant to medications and low Na  CKD III: creat at 1.3's - improved to 1.17  (+) tobacco - 1-2 ppd    Review of Systems   Constitutional: Positive for malaise/fatigue. Negative for chills, fever and weight loss.   HENT: Positive for congestion. Negative for nosebleeds and sore throat.    Respiratory: Positive for cough and wheezing. Negative for shortness of breath.    Cardiovascular: Negative for chest pain, palpitations and orthopnea.   Gastrointestinal: Negative for abdominal pain, heartburn, nausea and vomiting.   Genitourinary: Negative for dysuria, flank pain, frequency, hematuria and urgency.   Musculoskeletal: Positive for back pain, joint pain and myalgias.   Skin: Negative.    All other systems reviewed and are negative.         Objective:     /72 (BP Location: Right arm, Patient Position: Sitting)   Pulse 82   Temp 36.7 °C (98 °F)   Resp 16   Ht 1.6 m (5' 3\")   Wt 108.4 kg (239 lb)   SpO2 96%   BMI 42.34 kg/m²      Physical Exam   Constitutional: She is oriented to person, place, and time. She appears well-developed and well-nourished. No distress.   HENT:   Head: Normocephalic and atraumatic.   Nose: Nose normal.   Mouth/Throat: Oropharynx is clear and moist.   Eyes: Pupils are equal, round, and reactive to light. Conjunctivae and EOM are normal.   Neck: Normal range of motion. Neck supple. No thyromegaly present.   Cardiovascular: Normal rate, regular rhythm and normal heart sounds.  Exam reveals no gallop and no friction rub.    Pulmonary/Chest: Effort normal. She has wheezes.   Coarse BS   Abdominal: Soft. Bowel sounds are normal. She exhibits no distension. There is no tenderness.   Musculoskeletal: She exhibits no edema. "   Neurological: She is alert and oriented to person, place, and time. No cranial nerve deficit. Coordination normal.   Skin: Skin is warm. No rash noted. No erythema.   Nursing note and vitals reviewed.         Laboratory results reviewed: d/w pt  Lab Results   Component Value Date/Time    CREATININE 1.17 12/28/2018 11:16 AM    POTASSIUM 4.0 12/28/2018 11:16 AM          Assessment/Plan:     1. Stage 3 chronic kidney disease (HCC)       stable         2. Essential hypertension      BP well controlled    3. Microalbuminuria due to type 2 diabetes mellitus (HCC)      No microalbuminuria    4. Vitamin D deficiency      Low vit D level -to continue supplementation      Recs: to stop smoking             Keep well hydrated             Low Na diet             No NSAID's             F/u in 6 months with BMP, vit D

## 2019-02-03 DIAGNOSIS — R10.9 STOMACH ACHE: ICD-10-CM

## 2019-02-03 DIAGNOSIS — I25.10 CAD, MULTIPLE VESSEL: ICD-10-CM

## 2019-02-03 DIAGNOSIS — K21.00 GASTROESOPHAGEAL REFLUX DISEASE WITH ESOPHAGITIS: ICD-10-CM

## 2019-02-04 ENCOUNTER — APPOINTMENT (OUTPATIENT)
Dept: PHYSICAL THERAPY | Facility: REHABILITATION | Age: 59
End: 2019-02-04
Attending: FAMILY MEDICINE
Payer: MEDICAID

## 2019-02-04 DIAGNOSIS — I10 ESSENTIAL HYPERTENSION: ICD-10-CM

## 2019-02-04 RX ORDER — SUCRALFATE 1 G/1
1 TABLET ORAL
Qty: 60 TAB | Refills: 1 | Status: SHIPPED | OUTPATIENT
Start: 2019-02-04 | End: 2019-04-01 | Stop reason: SDUPTHER

## 2019-02-04 RX ORDER — FUROSEMIDE 40 MG/1
40 TABLET ORAL
Qty: 90 TAB | Refills: 0 | Status: SHIPPED | OUTPATIENT
Start: 2019-02-04 | End: 2019-04-01 | Stop reason: SDUPTHER

## 2019-02-04 RX ORDER — ASPIRIN/DIPYRIDAMOLE 25MG-200MG
CAPSULE,EXTENDED RELEASE MULTIPHASE 12HR ORAL
Qty: 60 CAP | Refills: 0 | Status: SHIPPED | OUTPATIENT
Start: 2019-02-04 | End: 2019-03-17 | Stop reason: SDUPTHER

## 2019-02-05 ENCOUNTER — APPOINTMENT (OUTPATIENT)
Dept: CARDIOLOGY | Facility: MEDICAL CENTER | Age: 59
End: 2019-02-05
Attending: INTERNAL MEDICINE
Payer: MEDICAID

## 2019-02-05 NOTE — TELEPHONE ENCOUNTER
Was the patient seen in the last year in this department? Yes    Does patient have an active prescription for medications requested? Yes    Received Request Via: Pharmacy       Insurance requires a 90 day Rx. Please advise.

## 2019-02-12 ENCOUNTER — PHYSICAL THERAPY (OUTPATIENT)
Dept: PHYSICAL THERAPY | Facility: REHABILITATION | Age: 59
End: 2019-02-12
Attending: FAMILY MEDICINE
Payer: MEDICAID

## 2019-02-12 DIAGNOSIS — M54.2 NECK PAIN: ICD-10-CM

## 2019-02-12 DIAGNOSIS — G89.4 CHRONIC PAIN SYNDROME: ICD-10-CM

## 2019-02-12 PROCEDURE — 97014 ELECTRIC STIMULATION THERAPY: CPT

## 2019-02-12 PROCEDURE — 97110 THERAPEUTIC EXERCISES: CPT

## 2019-02-12 PROCEDURE — 97112 NEUROMUSCULAR REEDUCATION: CPT

## 2019-02-12 NOTE — OP THERAPY DAILY TREATMENT
"  Outpatient Physical Therapy  DAILY TREATMENT     [facility] Physical [address].  [address]  [place] [address]  Phone:  [phone]  Fax:  [phone]    Date: 02/12/2019    Patient: Norma Jaimes  YOB: 1960  MRN: [id number]     Time Calculation  Start time: 0935  Stop time: 1015 Time Calculation (min): 40 minutes     Chief Complaint: Neck Problem    Visit #: 5    SUBJECTIVE:  Pain increases after I leave PT because I have to wait for the bus then the bus ride is very uncomfortable. Going to see MD about R wrist and talk to him about my R shoulder pain as well.     OBJECTIVE:          Therapeutic Exercises (CPT 14922):     1. Supine scap retraction, x10    2. Supine chin tuck, x5    3. NuStep, Level 1, 2.5 mi    4. Cervical AROM, rotation x10    5. LTR, x5    6. Post pelvic tilt , x5    7. L pec stretch supine, x30\"    8. Levator stretch for L, 5\" x3      Therapeutic Exercise Summary: Discussed continued need to increase mobility throughout the day to increase blood flow and help with pain. Discussed relaxation techniques and quiet setting with breathing to allow for her body to relax.     Therapeutic Treatments and Modalities:     1. Neuromuscular Re-education (CPT 77018), STM to L upper trap, SCM, gentle stretching. PROM R shoulder pain-free. Pt education re: posture and techniques to achieve proper posture. , x10 min    2. E Stim Unattended (CPT 32636), IFC and MHP to lower cervical/upper thoracic with added MHP to R shoulder, x15 min    Therapeutic Treatment and Modalities Summary:         Time-based treatments/modalities:  Therapeutic exercise minutes (CPT 49561): 15 minutes  Neuromusc re-ed, balance, coor, post minutes (CPT 31115): 10 minutes           ASSESSMENT:   Response to treatment: Pt continues to report significant pain throughout body that limits tolerance to exercise. She is willing to complete, but rep/time must be limited to avoid exacerbating " symptoms. Fair response to pain education, though pt does seem to be frustrated with current pain status.     PLAN/RECOMMENDATIONS:   Plan for treatment: therapy treatment to continue next visit.  Planned interventions for next visit: continue with current treatment. Continue with pain education and working to improve posture.

## 2019-02-14 ENCOUNTER — HOSPITAL ENCOUNTER (OUTPATIENT)
Dept: CARDIOLOGY | Facility: MEDICAL CENTER | Age: 59
End: 2019-02-14
Attending: INTERNAL MEDICINE
Payer: MEDICAID

## 2019-02-14 DIAGNOSIS — I25.118 CORONARY ARTERY DISEASE OF NATIVE ARTERY OF NATIVE HEART WITH STABLE ANGINA PECTORIS (HCC): ICD-10-CM

## 2019-02-14 LAB
LV EJECT FRACT  99904: 50
LV EJECT FRACT MOD 2C 99903: 46.11
LV EJECT FRACT MOD 4C 99902: 44.55
LV EJECT FRACT MOD BP 99901: 44.54

## 2019-02-14 PROCEDURE — 93306 TTE W/DOPPLER COMPLETE: CPT

## 2019-02-14 PROCEDURE — 93306 TTE W/DOPPLER COMPLETE: CPT | Mod: 26 | Performed by: INTERNAL MEDICINE

## 2019-02-15 ENCOUNTER — APPOINTMENT (OUTPATIENT)
Dept: PHYSICAL THERAPY | Facility: REHABILITATION | Age: 59
End: 2019-02-15
Attending: FAMILY MEDICINE
Payer: MEDICAID

## 2019-02-17 ENCOUNTER — PATIENT MESSAGE (OUTPATIENT)
Dept: MEDICAL GROUP | Facility: MEDICAL CENTER | Age: 59
End: 2019-02-17

## 2019-02-17 DIAGNOSIS — M79.7 FIBROMYALGIA: ICD-10-CM

## 2019-02-17 DIAGNOSIS — G89.4 CHRONIC PAIN SYNDROME: ICD-10-CM

## 2019-02-18 ENCOUNTER — APPOINTMENT (OUTPATIENT)
Dept: PHYSICAL THERAPY | Facility: REHABILITATION | Age: 59
End: 2019-02-18
Attending: FAMILY MEDICINE
Payer: MEDICAID

## 2019-02-19 ENCOUNTER — TELEPHONE (OUTPATIENT)
Dept: MEDICAL GROUP | Facility: MEDICAL CENTER | Age: 59
End: 2019-02-19

## 2019-02-19 DIAGNOSIS — M79.7 PRIMARY FIBROMYALGIA: ICD-10-CM

## 2019-02-19 RX ORDER — PREGABALIN 200 MG/1
CAPSULE ORAL
Qty: 60 CAP | Refills: 6 | Status: SHIPPED | OUTPATIENT
Start: 2019-02-19 | End: 2019-03-19

## 2019-02-19 RX ORDER — PREGABALIN 200 MG/1
200 CAPSULE ORAL 2 TIMES DAILY
Qty: 30 CAP | Refills: 5 | Status: SHIPPED | OUTPATIENT
Start: 2019-02-19 | End: 2019-03-21

## 2019-02-19 NOTE — TELEPHONE ENCOUNTER
1. Name: WILBERT REESE     Call Back Number: [phone]  Patient approves a detailed voicemail message: yes    2. Which medication(s) is being requested? LYRICA 200 MG    3. What is the preferred Pharmacy? [facility] in [place]    Patient was informed they may receive a return phone call from our office with any additional questions before processing this request.

## 2019-02-19 NOTE — TELEPHONE ENCOUNTER
From: Norma Jaimes  To: MARIAH Haro  Sent: 2/17/2019 10:59 AM PST  Subject: Prescription Question    My meds for lyrics was written out as two per day and 30 pills instead of 60pills can you fix it I will be out soon thank you

## 2019-02-20 ENCOUNTER — APPOINTMENT (OUTPATIENT)
Dept: PHYSICAL THERAPY | Facility: REHABILITATION | Age: 59
End: 2019-02-20
Attending: FAMILY MEDICINE
Payer: MEDICAID

## 2019-02-25 ENCOUNTER — TELEPHONE (OUTPATIENT)
Dept: PHYSICAL THERAPY | Facility: REHABILITATION | Age: 59
End: 2019-02-25

## 2019-02-25 NOTE — OP THERAPY DISCHARGE SUMMARY
Outpatient Physical Therapy  DISCHARGE SUMMARY NOTE      [facility] Physical Therapy [address].  [address]  [place] [address]  Phone:  [phone]  Fax:  [phone]    Date of Visit: 2019    Patient: Norma Jaimes  YOB: 1960  MRN: [id number]     Referring Provider: Kenneth Arreola M.D.   Referring Diagnosis Chronic pain syndrome [G89.4];Neck pain [M54.2]     Physical Therapy Occurrence Codes    Date of onset of impairment:  1998   Date physical therapy care plan established or reviewed:  18   Date physical therapy treatment started:  18            Your patient is being discharged from Physical Therapy with the following comments:   · Progress plateau    Comments:  Current referral and insurance authorization  19.  Pt has no show'd / cancelled 3 times.       Limitations Remaining:  No significant change in symptoms with PT.    Raven Zamora, PT    Date: 2019

## 2019-03-06 ENCOUNTER — TELEPHONE (OUTPATIENT)
Dept: MEDICAL GROUP | Facility: MEDICAL CENTER | Age: 59
End: 2019-03-06

## 2019-03-06 ENCOUNTER — TELEPHONE (OUTPATIENT)
Dept: SCHEDULING | Facility: IMAGING CENTER | Age: 59
End: 2019-03-06

## 2019-03-06 NOTE — TELEPHONE ENCOUNTER
Phone Number Called: [phone] (home)       Message: Called patient to check on her. Patient was a little frustrated as she states that she was not having seizures during the day, and notices that she may have gotten them at night. Patient just wanted Dr Etienne to be aware. Patient does not have a neurologist, and will speak to Dr Etienne when she is seen. I advised patient that if she has any seizures she needs to go to the ER and not wait for her appointment. Patient voiced understanding.     Left Message for patient to call back: no

## 2019-03-19 ENCOUNTER — OFFICE VISIT (OUTPATIENT)
Dept: MEDICAL GROUP | Facility: MEDICAL CENTER | Age: 59
End: 2019-03-19
Attending: FAMILY MEDICINE
Payer: MEDICAID

## 2019-03-19 VITALS
SYSTOLIC BLOOD PRESSURE: 118 MMHG | WEIGHT: 230 LBS | OXYGEN SATURATION: 92 % | HEIGHT: 63 IN | HEART RATE: 74 BPM | TEMPERATURE: 96.8 F | RESPIRATION RATE: 16 BRPM | BODY MASS INDEX: 40.75 KG/M2 | DIASTOLIC BLOOD PRESSURE: 58 MMHG

## 2019-03-19 DIAGNOSIS — J06.9 VIRAL UPPER RESPIRATORY TRACT INFECTION: ICD-10-CM

## 2019-03-19 DIAGNOSIS — L02.411 CUTANEOUS ABSCESS OF RIGHT AXILLA: ICD-10-CM

## 2019-03-19 PROCEDURE — 99213 OFFICE O/P EST LOW 20 MIN: CPT | Performed by: FAMILY MEDICINE

## 2019-03-19 PROCEDURE — 99214 OFFICE O/P EST MOD 30 MIN: CPT | Performed by: FAMILY MEDICINE

## 2019-03-19 RX ORDER — ALBUTEROL SULFATE 90 UG/1
2 AEROSOL, METERED RESPIRATORY (INHALATION) EVERY 6 HOURS PRN
Qty: 8.5 G | Refills: 3 | Status: SHIPPED | OUTPATIENT
Start: 2019-03-19 | End: 2019-04-01 | Stop reason: SDUPTHER

## 2019-03-19 RX ORDER — BENZONATATE 200 MG/1
200 CAPSULE ORAL 3 TIMES DAILY PRN
Qty: 20 CAP | Refills: 0 | Status: SHIPPED | OUTPATIENT
Start: 2019-03-19 | End: 2019-04-01 | Stop reason: SDUPTHER

## 2019-03-19 RX ORDER — CEPHALEXIN 500 MG/1
500 CAPSULE ORAL 4 TIMES DAILY
Qty: 30 CAP | Refills: 0 | Status: SHIPPED | OUTPATIENT
Start: 2019-03-19

## 2019-03-19 NOTE — PROGRESS NOTES
Subjective:      Norma Jaimes is a 58 y.o. female who presents with Establish Care; Cough; Shortness of Breath; and Pharyngitis            HPI 1.  Acute URI-patient reports onset 5 days ago of head congestion along with severe sore throats and frequent cough and dyspnea.  She has been producing small amounts of phlegm just in the last 24 hours.  2.  Uncontrolled diabetes mellitus-patient reports morning sugars are often [id number] or higher.  Evening sugars are usually over 200.  She is injecting 50 units of Lantus once daily along with taking metformin.  Not reporting any low blood sugars characterized by headache or diaphoresis.  3.  Skin boils-patient reports recurrent episodes of abscesses occurring in her groins and axilla.  Reports popping one in her right axilla about 2 days ago.  Denies any diffuse rash  Social history-single, not working  Current medications-  Prior to Admission medications    Medication Sig Start Date End Date Taking? Authorizing Provider   albuterol 108 (90 Base) MCG/ACT Aero Soln inhalation aerosol Inhale 2 Puffs by mouth every 6 hours as needed for Shortness of Breath. 3/19/19  Yes Jon Etienne M.D.   cephALEXin (KEFLEX) 500 MG Cap Take 1 Cap by mouth 4 times a day. 3/19/19  Yes Jon Etienne M.D.   benzonatate (TESSALON) 200 MG capsule Take 1 Cap by mouth 3 times a day as needed for Cough. 3/19/19  Yes Jon Etienne M.D.   pregabalin (LYRICA) 200 MG capsule TAKE ONE CAPSULE BY MOUTH TWICE A DAY FOR 30 DAYS 2/19/19 3/19/19 Yes Jon Etienne M.D.   sucralfate (CARAFATE) 1 GM Tab TAKE 1 TAB BY MOUTH 4 TIMES A DAY,BEFORE MEALS AND AT BEDTIME. 2/4/19  Yes Cipriano Camargo   furosemide (LASIX) 40 MG Tab Take 1 Tab by mouth every day. 2/4/19  Yes Cipriano Camargo   ondansetron (ZOFRAN ODT) 4 MG TABLET DISPERSIBLE Take 1 Tab by mouth every 6 hours as needed for Nausea. 1/7/19  Yes Cipriano Camargo   tizanidine (ZANAFLEX) 4 MG Tab Take 1 Tab by mouth every 6  "hours as needed. 1/7/19  Yes Cipriano Camargo   potassium chloride SA (K-DUR) 10 MEQ Tab CR Take 1 Tab by mouth 2 times a day. TAKE 1 TABLET BY MOUTH TWICE A DAY 1/7/19  Yes Cipriano Camargo   levetiracetam (KEPPRA) 1000 MG tablet Take 1.5 Tabs by mouth 2 Times a Day. 1/7/19  Yes Cipriano Camargo   AGGRENOX  MG CAPSULE SR 12 HR Take 1 Cap by mouth 2 times a day. 1/7/19  Yes Cipriano Camargo   topiramate (TOPAMAX) 50 MG tablet Take 1 Tab by mouth 2 times a day. TAKE 1 TABLET BY MOUTH TWICE A DAY 1/7/19  Yes Cipriano Camargo   metFORMIN (GLUCOPHAGE) 500 MG Tab Take 1 Tab by mouth 2 times a day, with meals. 1/7/19  Yes Cipriano Camargo   metoprolol (LOPRESSOR) 25 MG Tab Take 1 Tab by mouth every day. 1/7/19  Yes Cipriano Camargo   insulin glargine (LANTUS) 100 UNIT/ML Solution Inject 80 Units as instructed at bedtime as needed. 1/7/19  Yes Cipriano Camargo   insulin lispro (HUMALOG) 100 UNIT/ML Solution Inject 40 Units as instructed 3 times a day before meals. 1/7/19  Yes Cipriano Camargo   Lancets Lancets covered by insurance, to use 4x a day 1/7/19  Yes Cipriano Camargo   ergocalciferol (DRISDOL) 21340 UNIT capsule Take 1 Cap by mouth every 7 days. 1/7/19  Yes Cipriano Camargo   Insulin Syringe-Needle U-100 (INSULIN SYRINGE .5CC/31GX5/16\") 31G X 5/16\" 0.5 ML Misc To use with long acting and slid scale insulin up to 4x per day 1/7/19  Yes Cipriano Camargo   busPIRone (BUSPAR) 30 MG tablet TAKE 1 TABLET BY MOUTH TWICE A DAY 11/10/18  Yes Physician Outpatient   venlafaxine (EFFEXOR-XR) 150 MG extended-release capsule  12/22/18  Yes Physician Outpatient   atorvastatin (LIPITOR) 80 MG tablet Take 1 Tab by mouth every evening. 12/31/18  Yes Hudson Miranda M.D.   hydrOXYzine HCl (ATARAX) 10 MG Tab Take 1 Tab by mouth 2 times a day as needed for Itching. 11/14/18  Yes Cipriano Camargo   ONE TOUCH LANCETS Misc Test bld sugar 4x per day w lancets 10/10/18  Yes Cipriano Camargo   mirtazapine (REMERON) 45 MG tablet " "TAKE 1 TAB BY MOUTH 1 TIME DAILY AS NEEDED. 10/2/18  Yes Cipriano Camargo   Cranberry 1000 MG Cap Take 1,000 mg by mouth every day.   Yes Physician Outpatient   quetiapine (SEROQUEL) 400 MG tablet Take 2 Tabs by mouth every bedtime. 8/8/18  Yes Cipriano Camargo   AGGRENOX  MG CAPSULE SR 12 HR TAKE 1 CAPSULE BY MOUTH TWICE A DAY 3/18/19   Jon Etienne M.D.   ranitidine (ZANTAC) 300 MG tablet TAKE 1 TABLET BY MOUTH TWICE A DAY 2/25/19   Jon Etienne M.D.   pregabalin (LYRICA) 200 MG capsule Take 1 Cap by mouth 2 times a day for 30 days. 2/19/19 3/21/19  Jon Etienne M.D.   pantoprazole (PROTONIX) 20 MG tablet Take 1 Tab by mouth every day. 1/7/19   Cipriano Camargo   doxycycline (VIBRAMYCIN) 100 MG Tab TAKE 1 TABLET BY MOUTH TWICE A DAY 11/14/18   Physician Outpatient       ROS negative for recorded fevers.  Positive for nausea and just today some brown diarrhea       Objective:     /58 (BP Location: Left arm, Patient Position: Sitting, BP Cuff Size: Adult)   Pulse 74   Temp 36 °C (96.8 °F) (Temporal)   Resp 16   Ht 1.6 m (5' 2.99\")   Wt 104.3 kg (230 lb)   SpO2 92%   BMI 40.75 kg/m²      Physical Exam  General- alert,cooperative patient in no acute distress  Ears- normal tms without redness, perforation. Canals unremarkable  Nares- clear, pink, moist mucosa without bleeding. No purulent nasal DC  Orophx.- lips normal. Clear, pink, moist mucosa without redness or exudate. Tongue is midline  Chest-Normal to auscultation and percussion, movement is symmetric. Nontender to palpation.  Abdomen-obese contour, soft, nontender.  Bowel sounds are diminished.  No palpable masses  Skin-right axilla-6 7 mm red slightly thickened papule without any purulent drainage-this is a site of a recently opened abscess 48 hours ago   Cardiac-regular rate and rhythm. No heave or thrill. Murmur absent       Assessment/Plan:     1. Viral upper respiratory tract infection      2. Uncontrolled type 2 " diabetes mellitus with kidney complication, with long-term current use of insulin (HCC)    3.  Skin boils  Plan: 1.  Patient will check sugars alternating before breakfast and before dinner record those and forward  those in 2 weeks  2.  Rx Keflex 500 mg 3 times daily (skin boils)  3.  Tessalon Perles 200 mg  4.  Albuterol inhaler every 4-6 hours as needed for dyspnea  5.  Revisit one month  6.  Discussed reducing cigarettes from 30/day down to 20/day

## 2019-03-29 ENCOUNTER — SLEEP CENTER VISIT (OUTPATIENT)
Dept: SLEEP MEDICINE | Facility: MEDICAL CENTER | Age: 59
End: 2019-03-29
Payer: MEDICAID

## 2019-03-29 ENCOUNTER — TELEPHONE (OUTPATIENT)
Dept: PULMONOLOGY | Facility: HOSPICE | Age: 59
End: 2019-03-29

## 2019-03-29 VITALS
WEIGHT: 228 LBS | SYSTOLIC BLOOD PRESSURE: 112 MMHG | HEIGHT: 62 IN | HEART RATE: 73 BPM | BODY MASS INDEX: 41.96 KG/M2 | RESPIRATION RATE: 15 BRPM | DIASTOLIC BLOOD PRESSURE: 70 MMHG | OXYGEN SATURATION: 93 %

## 2019-03-29 DIAGNOSIS — G47.33 OBSTRUCTIVE SLEEP APNEA SYNDROME: ICD-10-CM

## 2019-03-29 PROCEDURE — 99213 OFFICE O/P EST LOW 20 MIN: CPT | Performed by: FAMILY MEDICINE

## 2019-03-29 NOTE — PROGRESS NOTES
[facility] Follow Up Note     Date: 3/29/2019 / Time: 2:50 PM    Patient ID:   Name:             Norma Jaimes   YOB: 1960  Age:                 58 y.o.  female   MRN:               [id number]      Thank you for requesting a sleep medicine consultation on Norma Jaimes at the sleep center. She presents today with the chief complaints of MAGNOLIA follow up.     HISTORY OF PRESENT ILLNESS:       Pt is currently on CPAP 12. She goes to sleep around 8-9 pm and wakes up around 4 am. She is getting about 7-8 hrs of sleep on a good night and about 5-6 hr of sleep on a bad night. The bad nights were rare when she had a working CPAP. Overall, she doesnot finds her sleep refreshing. When She  wakes up in the morning, She does feel tired. She does not take regular naps however if she does she takes 1-2 hr long napShe has relapse of snoring, apneas and insomnia since her machine broke down 2-3 days go.         SLEEP HISTORY   A sleep test 9n 2011 demonstrated an apnea hypopnea index of 57 events per hour with a lowest arterial oxygen saturation of 81% on room air. A more recent polysomnogram on January 21, 2016 demonstrated an apnea hypopnea index of 15.9 events per hour with no REM sleep time recorded. The lowest arterial oxygen saturation was 83% on room air. In a titration polysomnogram on February 4, 2016 she did well on CPAP at 12 cm water where the apnea hypopnea index fell to 1 event per hour with a lowest arterial oxygen saturation of 89% on room air.        REVIEW OF SYSTEMS:       Constitutional: Denies fevers, Denies weight changes  Eyes: Denies changes in vision, no eye pain  Ears/Nose/Throat/Mouth: + nasal congestion and sore throat   Cardiovascular: Denies chest pain or palpitations   Respiratory: + shortness of breath , +cough  Gastrointestinal/Hepatic: Denies abdominal pain, nausea, vomiting, diarrhea, constipation or GI bleeding   Genitourinary: Deniesdysuria or  frequency  Musculoskeletal/Rheum: Denies  joint pain and swelling   Skin/Breast: Denies rash,   Neurological: Denies headache, confusion, memory loss or focal weakness/parasthesias  Psychiatric: denies mood disorder   Sleep: relapse of snoring and apneas     Comprehensive review of systems form is reviewed with the patient and is attached in the EMR.     PMH:  has a past medical history of Anxiety; Asthma; Back pain; Bipolar affective disorder ([facility]); Bronchitis; CHF (congestive heart failure) ([facility]); Chickenpox; CKD (chronic kidney disease) stage 3, GFR 30-59 ml/min ([facility]); Colon polyp; COPD (chronic obstructive pulmonary disease) ([facility]); Depression; Diabetes ([facility]); Heart attack ([facility]); Hyperlipidemia; Kidney disease; Kidney stone; Muscle disorder; Obesity; Pneumonia; Pulmonary embolism ([facility]); Seizure ([facility]); and Sleep apnea.  MEDS:   Current Outpatient Prescriptions:   •  albuterol 108 (90 Base) MCG/ACT Aero Soln inhalation aerosol, Inhale 2 Puffs by mouth every 6 hours as needed for Shortness of Breath., Disp: 8.5 g, Rfl: 3  •  cephALEXin (KEFLEX) 500 MG Cap, Take 1 Cap by mouth 4 times a day., Disp: 30 Cap, Rfl: 0  •  benzonatate (TESSALON) 200 MG capsule, Take 1 Cap by mouth 3 times a day as needed for Cough., Disp: 20 Cap, Rfl: 0  •  AGGRENOX  MG CAPSULE SR 12 HR, TAKE 1 CAPSULE BY MOUTH TWICE A DAY, Disp: 60 Cap, Rfl: 2  •  ranitidine (ZANTAC) 300 MG tablet, TAKE 1 TABLET BY MOUTH TWICE A DAY, Disp: 60 Tab, Rfl: 3  •  sucralfate (CARAFATE) 1 GM Tab, TAKE 1 TAB BY MOUTH 4 TIMES A DAY,BEFORE MEALS AND AT BEDTIME., Disp: 60 Tab, Rfl: 1  •  furosemide (LASIX) 40 MG Tab, Take 1 Tab by mouth every day., Disp: 90 Tab, Rfl: 0  •  ondansetron (ZOFRAN ODT) 4 MG TABLET DISPERSIBLE, Take 1 Tab by mouth every 6 hours as needed for Nausea., Disp: 20 Tab, Rfl: 1  •  pantoprazole (PROTONIX) 20 MG tablet, Take 1 Tab by mouth every day., Disp: 30 Tab, Rfl: 2  •  tizanidine (ZANAFLEX) 4 MG Tab, Take 1 Tab by mouth every 6 hours  "as needed., Disp: 120 Tab, Rfl: 2  •  potassium chloride SA (K-DUR) 10 MEQ Tab CR, Take 1 Tab by mouth 2 times a day. TAKE 1 TABLET BY MOUTH TWICE A DAY, Disp: 60 Tab, Rfl: 2  •  levetiracetam (KEPPRA) 1000 MG tablet, Take 1.5 Tabs by mouth 2 Times a Day., Disp: 90 Tab, Rfl: 2  •  AGGRENOX  MG CAPSULE SR 12 HR, Take 1 Cap by mouth 2 times a day., Disp: 60 Cap, Rfl: 2  •  topiramate (TOPAMAX) 50 MG tablet, Take 1 Tab by mouth 2 times a day. TAKE 1 TABLET BY MOUTH TWICE A DAY, Disp: 60 Tab, Rfl: 2  •  metFORMIN (GLUCOPHAGE) 500 MG Tab, Take 1 Tab by mouth 2 times a day, with meals., Disp: 180 Tab, Rfl: 0  •  metoprolol (LOPRESSOR) 25 MG Tab, Take 1 Tab by mouth every day., Disp: 90 Tab, Rfl: 0  •  insulin glargine (LANTUS) 100 UNIT/ML Solution, Inject 80 Units as instructed at bedtime as needed., Disp: 20 mL, Rfl: 2  •  insulin lispro (HUMALOG) 100 UNIT/ML Solution, Inject 40 Units as instructed 3 times a day before meals., Disp: 20 mL, Rfl: 2  •  Lancets, Lancets covered by insurance, to use 4x a day, Disp: 200 Each, Rfl: 2  •  ergocalciferol (DRISDOL) 30918 UNIT capsule, Take 1 Cap by mouth every 7 days., Disp: 4 Cap, Rfl: 11  •  Insulin Syringe-Needle U-100 (INSULIN SYRINGE .5CC/31GX5/16\") 31G X 5/16\" 0.5 ML Misc, To use with long acting and slid scale insulin up to 4x per day, Disp: 150 Each, Rfl: 2  •  busPIRone (BUSPAR) 30 MG tablet, TAKE 1 TABLET BY MOUTH TWICE A DAY, Disp: , Rfl: 0  •  venlafaxine (EFFEXOR-XR) 150 MG extended-release capsule, , Disp: , Rfl:   •  atorvastatin (LIPITOR) 80 MG tablet, Take 1 Tab by mouth every evening., Disp: 30 Tab, Rfl: 11  •  hydrOXYzine HCl (ATARAX) 10 MG Tab, Take 1 Tab by mouth 2 times a day as needed for Itching., Disp: 60 Tab, Rfl: 1  •  ONE TOUCH LANCETS Misc, Test bld sugar 4x per day w lancets, Disp: 200 Each, Rfl: 2  •  mirtazapine (REMERON) 45 MG tablet, TAKE 1 TAB BY MOUTH 1 TIME DAILY AS NEEDED., Disp: 30 Tab, Rfl: 0  •  Cranberry 1000 MG Cap, Take 1,000 mg " "by mouth every day., Disp: , Rfl:   •  quetiapine (SEROQUEL) 400 MG tablet, Take 2 Tabs by mouth every bedtime., Disp: 60 Tab, Rfl: 1  •  doxycycline (VIBRAMYCIN) 100 MG Tab, TAKE 1 TABLET BY MOUTH TWICE A DAY, Disp: , Rfl: 0  ALLERGIES:   Allergies   Allergen Reactions   • Imitrex [Sumatriptan]      palpitations     • Baclofen    • Ees [Erythromycin]    • Erythromycin [Akne-Mycin]      SURGHX:   Past Surgical History:   Procedure Laterality Date   • ABDOMINAL HYSTERECTOMY TOTAL     • APPENDECTOMY     • CARPAL TUNNEL RELEASE     • COLONOSCOPY      history of colon    • HYSTERECTOMY LAPAROSCOPY     • INTUBATION     • SINUSCOPE     • VENTILATOR CONTINUOUS       SOCHX:  reports that she has been smoking Cigarettes.  She has a 74.00 pack-year smoking history. She has never used smokeless tobacco. She reports that she does not drink alcohol or use drugs..  FH:   Family History   Problem Relation Age of Onset   • Heart Disease Mother    • Hypertension Mother    • Diabetes Father    • Cancer Maternal Grandfather    • Diabetes Paternal Grandmother    • Diabetes Paternal Grandfather    • Lung Disease Sister    • Cancer Son          Physical Exam:  Vitals/ General Appearance:   Weight/BMI: Body mass index is 41.7 kg/m².  Blood pressure 112/70, pulse 73, resp. rate 15, height 1.575 m (5' 2\"), weight 103.4 kg (228 lb), SpO2 93 %.  Vitals:    03/29/19 1439   BP: 112/70   BP Location: Right arm   Patient Position: Sitting   BP Cuff Size: Adult   Pulse: 73   Resp: 15   SpO2: 93%   Weight: 103.4 kg (228 lb)   Height: 1.575 m (5' 2\")       Pt. is alert and oriented to time, place and person. Cooperative and in no apparent distress.       1. Head: Atraumatic, normocephalic.   2. Ears: Normal tympanic membrane and no discharge  3. Nose: No inferior turbinate hypertophy, no septal deviation, no polyp.   4. Throat: Oropharynx appears crowded in that the palate is overhanging  5. Neck: Supple. No thyromegaly  6. Chest: Trachea central, " no spine deformity   7. Lungs auscultation: B/L good air entry, vesicular breath sounds,+ wheezing  8. Heart auscultation: 1st and 2nd heart sounds normal, regular rhythm. No appreciable murmur.  9. . Extremities: no pedal edema.  10. Skin: No rash  11. NEUROLOGICAL EXAMINATION: On neurological exam, the patient was alert and oriented x3. speech was clear and fluent without dysarthria.      ASSESSMENT AND PLAN     1.Obstructive Sleep Apnea .     The pathophysiology of sleep anea and the increased risk of cardiovascular morbidity from untreated sleep apnea is discussed in detail with the patient.     She is urged to avoid supine sleep, weight gain and alcoholic beverages since all of these can worsen sleep apnea. She is cautioned against drowsy driving. If She feels sleepy while driving, She must pull over for a break/nap, rather than persist on the road, in the interest of She own safety and that of others on the road.   Plan   - New CPAP 12 cm  Will be ordered after the split night study.   - Donated CPAP machine was given from the clinic for meanwhile   - compliance was reinforced     2. Regarding treatment of other past medical problems and general health maintenance,  She is urged to follow up with PCP.

## 2019-03-29 NOTE — TELEPHONE ENCOUNTER
Alcira CPAP machine broke the other night and would like some guidance on what to do. I told her to try and talk to here DME company to she if they could help. She informed me that here CPAP is old and not from current DME company.

## 2019-03-31 ENCOUNTER — SLEEP STUDY (OUTPATIENT)
Dept: SLEEP MEDICINE | Facility: MEDICAL CENTER | Age: 59
End: 2019-03-31
Attending: FAMILY MEDICINE
Payer: MEDICAID

## 2019-03-31 DIAGNOSIS — G47.33 OBSTRUCTIVE SLEEP APNEA SYNDROME: ICD-10-CM

## 2019-03-31 PROCEDURE — 95811 POLYSOM 6/>YRS CPAP 4/> PARM: CPT | Performed by: FAMILY MEDICINE

## 2019-04-02 ENCOUNTER — TELEPHONE (OUTPATIENT)
Dept: MEDICAL GROUP | Facility: MEDICAL CENTER | Age: 59
End: 2019-04-02

## 2019-04-02 ENCOUNTER — PATIENT MESSAGE (OUTPATIENT)
Dept: MEDICAL GROUP | Facility: MEDICAL CENTER | Age: 59
End: 2019-04-02

## 2019-04-03 NOTE — PROCEDURES
Clinical Comments:    The patient underwent a split night polysomnogram with a CPAP titration using the standard montage for measurement of paramaters of sleep, respiratory events, movement abnormalities, heart rate and rhythm.  A Microphone was used to monitior snoring.    Interpretation:  Study start time was 09:36:22 PM.  Total recording time was 4h 0.0m (240 minutes) with a total sleep time of 2h 57.0m (177 minutes) resulting in a sleep efficiency of 73.75%.  Sleep latency from the start fo the study was 49 minutes minutes and REM latency from sleep onset was 00 minutes minutes.    Respiratory:   There were 22 apneas in total consisting of 0 obstructive apneas, 0 mixed apneas, and 22 central apneas.  There were 22 hypopneas in total.  The apnea index was 7.46 per hour and the hypopnea index was 7.46 per hour.  The overall AHI was 14.9, with a REM AHI of 0.00, and a supine AHI of 0.00.  50% of the apneas were central apneas    Limb Movements:  There were a total of 15 periodic leg movements, of which 0 were PLMS arousals.  This resulted in a PLMS index of 5.1 and a PLMS arousal index of 0.0    Oximetry:  The mean SaO2 was 88.0% for the diagnostic portion of the study, with a minimum SaO2 of 79.0%.    Treatment:    Interpretation:  Treatment recording time was 4h 9.0m (249 minutes) with a total sleep time of 3h 45.0m (225 minutes) resulting in a sleep efficiency of 90.4%.    Sleep latency from the start of treatment was 00 minutes minutes and REM latency from sleep onset was 0h 0.0m minutes.    The patient had 17 arousals in total for an arousal index of 4.5.    Respiratory:   There were 14 apneas in total consisting of  4 obstructive apneas, 10 central apneas, and 0 mixed apneas for an apnea index of 3.73.    The patient had 12 hypopneas in total, which resulted in a hypopnea index of 3.20.    The overall AHI was 6.93, with a REM AHI of 0.00, and a supine AHI of 0.00.     35% of the apneas were central apneas      Limb Movements:  There were a total of 0 periodic leg movements, of which 0 were PLMS arousals.  This resulted in a PLMS index of 0.0 and a PLMS arousal index of 0.0.    Oximetry:  The mean SaO2 during treatment was 90.0%, with a minimum oxygen saturation of 84.0%.     CPAP was tried from 7 to 12cm H2O.      Technical summary: The patient underwent a split-night polysomnogram. This was a 16 channel montage study to include a 6 channel EEG, a 2 channel EOG, and chin EMG, left and right leg EMG, a snore channel, a nasal pressure transducer, and a nasal oral airflow semester and a CFLOW pressure transducer.   Respiratory effort was assessed with the use of a thoracic and abdominal monitor and overnight oximetry was obtained. Audio and video recordings were reviewed. This was a fully attended study and sleep stage scoring was performed. The test was technically adequate.    Scoring Criteria: A modification of the the AASM Manual for the Scoring of Sleep and Associated Events, 2012, was used.   Obstructive apnea was scored by cessation of airflow for at least 10 seconds with continuing respiratory effort.  Central apnea was scored by cessation of airflow for at least 10 seconds with no effort.  Hypopnea was scored by a 30% or more reduction in airflow for at least 10 seconds accompanied by an arterial oxygen desaturation of 3% or more.  (For Medicare patients, hypopneas were scored by a 30% or more reduction in airflow for at least 10 seconds accompanied by an arterial oxygen saturation of 4% or more, as required by their insurance, CMS.      Impression:  1.  Mild Central sleep apnea with AHI of 14.9/hr and O2 naveen 79 %.The titration started with CPAP 7 cm and the best tolerated was CPAP 12 cm. The AHI improved to 3.45/hr with improved O2 naveen of 84% and average O2 saturation of 92 %. Supine but not REM was observed on the ending pressure.      Recommendations:  I recommend CPAP of 12 cm with medium F 30 mask. I  also recommend 30 day compliance download to assess the efficacy to the recommended pressure, measure leak, apnea hypopnea index and compliance for further outpatient monitoring and management of CPAP therapy. In some cases alternative treatment options may prove effective in resolving sleep apnea and these options include upper airway surgery, the use of a dental orthotic or weight loss and positional therapy. Clinical correlation is required. In general patients with sleep apnea are advised to avoid alcohol and sedatives and to not operate a motor vehicle while drowsy and are at a greater risk for cardiovascular disease.

## 2019-04-04 ENCOUNTER — SLEEP CENTER VISIT (OUTPATIENT)
Dept: SLEEP MEDICINE | Facility: MEDICAL CENTER | Age: 59
End: 2019-04-04
Payer: MEDICAID

## 2019-04-04 VITALS
TEMPERATURE: 97.9 F | BODY MASS INDEX: 42.69 KG/M2 | WEIGHT: 232 LBS | HEART RATE: 79 BPM | RESPIRATION RATE: 18 BRPM | HEIGHT: 62 IN | DIASTOLIC BLOOD PRESSURE: 74 MMHG | SYSTOLIC BLOOD PRESSURE: 122 MMHG | OXYGEN SATURATION: 91 %

## 2019-04-04 DIAGNOSIS — G47.33 OSA (OBSTRUCTIVE SLEEP APNEA): ICD-10-CM

## 2019-04-04 PROCEDURE — 99214 OFFICE O/P EST MOD 30 MIN: CPT | Performed by: NURSE PRACTITIONER

## 2019-04-04 NOTE — PROGRESS NOTES
"CC:  Here for f/u sleep issues as listed below    HPI:   Norma presents today for follow up obstructive sleep apnea and sleep study results.  Past medical history of hypertension, fatigue, chronic headache, type 2 diabetes, chronic kidney disease stage III.  Tobacco abuse.  She is a every day smoker 74-pack-year history, smoking 2 packs/day.  She is not interested in quitting but understands the benefits of complete smoking cessation.    A sleep test in 2011 demonstrated an apnea hypopnea index of 57 events per hour with a lowest arterial oxygen saturation of 81%. A more recent polysomnogram on January 21, 2016 demonstrated an apnea hypopnea index of 15.9 events per hour with no REM sleep time recorded, and lowest arterial oxygen saturation was 83%.  She required split-night titration study that was completed 3-2019 per insurance to obtain new machine.  Her current one is extremely hot, causes her lungs to \"burn\", and causes her to wake up.  Split night titration noted an AHI of 14.9 with minimum oxygenation of 79%.  She was successful on a CPAP pressure of 12 with reduced AHI of 3.5, mean oxygenation of 92%.  She did not have any REM sleep the entire study.  Reviewed results and amendable to continuing CPAP therapy.     Currently she is being treated with CPAP @ [id number].  She wears the machine 8 hours a night with the machine. She denies naps.  She Has chronic pain that is uncontrolled and following a pain specialist.  She takes sleeping aids every night including Seroquel.         Patient Active Problem List    Diagnosis Date Noted   • Cutaneous abscess of right axilla 03/19/2019   • Essential hypertension 01/31/2019   • Headache, unspecified headache type 01/07/2019   • Stomach ache 01/07/2019   • Right wrist pain 01/07/2019   • Vaginal cyst 11/14/2018   • Cough 11/14/2018   • Vitamin D deficiency 10/10/2018   • Assistance needed with transportation 10/10/2018   • Microalbuminuria due to type 2 diabetes mellitus " (Formerly [facility]) 10/04/2018   • Neuropathy (Formerly [facility]) 09/27/2018   • Fatigue 09/27/2018   • Pain in both feet 09/11/2018   • Poor vision 09/11/2018   • Skin infection 09/11/2018   • Medication management 08/08/2018   • Uncontrolled type 2 diabetes mellitus with kidney complication, with long-term current use of insulin (Formerly [facility]) 08/08/2018   • Colon polyps 08/08/2018   • Mixed hyperlipidemia 08/08/2018   • Tobacco abuse 08/08/2018   • Psychiatric disorder 08/08/2018   • Hot flashes 08/08/2018   • Obstructive sleep apnea syndrome 08/08/2018   • Stage 3 chronic kidney disease (Formerly [facility]) 08/08/2018   • CAD, multiple vessel 08/08/2018   • Chronic pain syndrome 08/08/2018       Past Medical History:   Diagnosis Date   • Anxiety    • Asthma    • Back pain    • Bipolar affective disorder (Formerly [facility])    • Bronchitis    • CHF (congestive heart failure) (Formerly [facility])    • Chickenpox    • CKD (chronic kidney disease) stage 3, GFR 30-59 ml/min ([facility])    • Colon polyp    • COPD (chronic obstructive pulmonary disease) (Formerly [facility])    • Depression    • Diabetes ([facility]ly [facility])    • Heart attack ([facility])    • Hyperlipidemia    • Kidney disease    • Kidney stone    • Muscle disorder    • Obesity    • Pneumonia    • Pulmonary embolism ([facility])    • Seizure ([facility])    • Sleep apnea        Past Surgical History:   Procedure Laterality Date   • ABDOMINAL HYSTERECTOMY TOTAL     • APPENDECTOMY     • CARPAL TUNNEL RELEASE     • COLONOSCOPY      history of colon    • HYSTERECTOMY LAPAROSCOPY     • INTUBATION     • SINUSCOPE     • VENTILATOR CONTINUOUS         Family History   Problem Relation Age of Onset   • Heart Disease Mother    • Hypertension Mother    • Diabetes Father    • Cancer Maternal Grandfather    • Diabetes Paternal Grandmother    • Diabetes Paternal Grandfather    • Lung Disease Sister    • Cancer Son        Social History     Social History   • Marital status:      Spouse name: N/A   • Number of children: N/A   • Years of education: N/A     Occupational History   • Not on file.  "    Social History Main Topics   • Smoking status: Current Every Day Smoker     Packs/day: 2.00     Years: 37.00     Types: Cigarettes   • Smokeless tobacco: Never Used      Comment: started smoking at age 22    • Alcohol use No   • Drug use: No   • Sexual activity: Not Currently     Other Topics Concern   • Not on file     Social History Narrative   • No narrative on file       Current Outpatient Prescriptions   Medication Sig Dispense Refill   • esomeprazole (NEXIUM 24HR) 20 MG capsule Take 1 Cap by mouth every morning before breakfast. 30 Cap 5   • tizanidine (ZANAFLEX) 4 MG Tab TAKE 1 TABLET BY MOUTH EVERY 6 HOURS AS NEEDED 120 Tab 2   • atorvastatin (LIPITOR) 80 MG tablet Take 1 Tab by mouth every evening. 30 Tab 11   • busPIRone (BUSPAR) 30 MG tablet Take 1 Tab by mouth 2 times a day. TAKE 1 TABLET BY MOUTH TWICE A DAY 30 Tab 3   • Cranberry 1000 MG Cap Take 1,000 mg by mouth every day. 30 Cap 3   • furosemide (LASIX) 40 MG Tab Take 1 Tab by mouth every day. 90 Tab 0   • ergocalciferol (DRISDOL) 94229 UNIT capsule Take 1 Cap by mouth every 7 days. 4 Cap 11   • hydrOXYzine HCl (ATARAX) 10 MG Tab Take 1 Tab by mouth 2 times a day as needed for Itching. 60 Tab 3   • insulin glargine (LANTUS) 100 UNIT/ML Solution Inject 80 Units as instructed at bedtime as needed. 20 mL 2   • Insulin Syringe-Needle U-100 (INSULIN SYRINGE .5CC/31GX5/16\") 31G X 5/16\" 0.5 ML Misc To use with long acting and slid scale insulin up to 4x per day 150 Each 2   • Lancets Lancets covered by insurance, to use 4x a day 200 Each 2   • topiramate (TOPAMAX) 50 MG tablet Take 1 Tab by mouth 2 times a day. TAKE 1 TABLET BY MOUTH TWICE A DAY 60 Tab 2   • sucralfate (CARAFATE) 1 GM Tab Take 1 Tab by mouth 4 Times a Day,Before Meals and at Bedtime. 60 Tab 1   • quetiapine (SEROQUEL) 400 MG tablet Take 1 Tab by mouth every bedtime. 30 Tab 1   • ranitidine (ZANTAC) 300 MG tablet TAKE 1 TABLET BY MOUTH TWICE A DAY 60 Tab 3   • potassium chloride SA " (K-DUR) 10 MEQ Tab CR Take 1 Tab by mouth 2 times a day. TAKE 1 TABLET BY MOUTH TWICE A DAY 60 Tab 2   • ondansetron (ZOFRAN ODT) 4 MG TABLET DISPERSIBLE Take 1 Tab by mouth every 6 hours as needed for Nausea. 20 Tab 1   • mirtazapine (REMERON) 45 MG tablet Take 1 Tab by mouth 1 time daily as needed. 30 Tab 0   • metoprolol (LOPRESSOR) 25 MG Tab Take 1 Tab by mouth every day. 90 Tab 0   • metFORMIN (GLUCOPHAGE) 500 MG Tab Take 1 Tab by mouth 2 times a day, with meals. 180 Tab 0   • cephALEXin (KEFLEX) 500 MG Cap Take 1 Cap by mouth 4 times a day. 30 Cap 0   • levetiracetam (KEPPRA) 1000 MG tablet Take 1.5 Tabs by mouth 2 Times a Day. 90 Tab 2   • AGGRENOX  MG CAPSULE SR 12 HR Take 1 Cap by mouth 2 times a day. 60 Cap 2   • insulin lispro (HUMALOG) 100 UNIT/ML Solution Inject 40 Units as instructed 3 times a day before meals. 20 mL 2   • venlafaxine (EFFEXOR-XR) 150 MG extended-release capsule      • ONE TOUCH LANCETS Misc Test bld sugar 4x per day w lancets 200 Each 2   • LANTUS 100 UNIT/ML Solution INJECT 80 UNITS AS INSTRUCTED AT BEDTIME AS NEEDED. 20 mL 6   • AGGRENOX  MG CAPSULE SR 12 HR Take 1 Cap by mouth 2 times a day. 60 Cap 2   • benzonatate (TESSALON) 200 MG capsule Take 1 Cap by mouth 3 times a day as needed for Cough. (Patient not taking: Reported on 4/4/2019) 20 Cap 0   • albuterol 108 (90 Base) MCG/ACT Aero Soln inhalation aerosol Inhale 2 Puffs by mouth every 6 hours as needed for Shortness of Breath. 8.5 g 3   • pantoprazole (PROTONIX) 20 MG tablet Take 1 Tab by mouth every day. (Patient not taking: Reported on 4/4/2019) 30 Tab 2     No current facility-administered medications for this visit.           Allergies: Imitrex [sumatriptan]; Baclofen; Ees [erythromycin]; Erythromycin [akne-mycin]; and Morphine      ROS   Gen: Denies fever, chills, unintentional weight loss, fatigue  Resp:Denies Dyspnea  CV: Denies chest pain, chest tightness  Sleep:Denies morning headache, insomnia, daytime  "somnolence, snoring, gasping for air, apnea  Neuro: Denies frequent headaches, weakness, dizziness  See HPI.  All other systems reviewed and negative        Vital signs for this encounter:  Vitals:    04/04/19 1200 04/04/19 1257   Height:  1.575 m (5' 2\")   Weight:  105.2 kg (232 lb)   Weight % change since last entry.:  0 %   BP:  122/74   Pulse:  79   BMI (Calculated):  42.43   Resp:  18   Temp:  36.6 °C (97.9 °F)   TempSrc:  Temporal   O2 sat % room air: (!) 91 %                    Physical Exam:   Gen:         Alert and oriented, No apparent distress.   Neck:        No Lymphadenopathy.  Lungs:     Clear to auscultation bilaterally.    CV:          Regular rate and rhythm. No murmurs, rubs or gallops.   Abd:         Soft non tender, non distended.            Ext:          No clubbing, cyanosis, edema.    Assessment   1. MAGNOLIA (obstructive sleep apnea)  DME CPAP       Patient is clinically stable and will proceed with following plan.     PLAN:   Patient Instructions   1) Start new CPAP machine at [id number] - placed STAT   2) Clean mask and supplies weekly and change them as insurance allows  3) Light conditioning encouraged  4) Encourage  smoking cessation   5) Follow up with pain specialist  4) Vaccines: Recommended   5) Return in about 2 months (around 6/4/2019) for Compliance, review of symptoms, if not sooner, follow up with THUY Abreu.        "

## 2019-04-04 NOTE — PATIENT INSTRUCTIONS
1) Start new CPAP machine at [id number] - placed STAT  2) Clean mask and supplies weekly and change them as insurance allows  3) Light conditioning encouraged  4) Encourage  smoking cessation   5) Follow up with pain specialist  4) Vaccines: Recommended   5) Return in about 2 months (around 6/4/2019) for Compliance, review of symptoms, if not sooner, follow up with THUY Abreu.

## 2019-04-08 ENCOUNTER — TELEPHONE (OUTPATIENT)
Dept: MEDICAL GROUP | Facility: MEDICAL CENTER | Age: 59
End: 2019-04-08

## 2019-04-08 NOTE — TELEPHONE ENCOUNTER
MEDICATION PRIOR AUTHORIZATION NEEDED:  Currently pending    1. Name of Medication: Esomeprazole    2. Requested By (Name of Pharmacy):      3. Is insurance on file current? Medicaid FFS    4. What is the name & phone number of the 3rd party payor?

## 2019-04-08 NOTE — TELEPHONE ENCOUNTER
DOCUMENTATION OF PAR STATUS:    1. Name of Medication & Dose:  AGGRENOX  MG     2. Name of Prescription Coverage Company & phone #: Medicaid FFS     3. Date Prior Auth Submitted: 4/8/19    4. What information was given to obtain insurance decision? Office note, Dx, Med Hx     5. Prior Auth Status? Pending    6. Patient Notified: yes

## 2019-04-09 NOTE — TELEPHONE ENCOUNTER
FINAL PRIOR AUTHORIZATION STATUS:    1.  Name of Medication & Dose: Aggrenox     2. Prior Auth Status: Canceled: This medication currently does note require clinical review, and you may pick this up from your pharmacy    3. Action Taken: Pharmacy Notified: yes Patient Notified: yes

## 2019-04-19 ENCOUNTER — OFFICE VISIT (OUTPATIENT)
Dept: MEDICAL GROUP | Facility: MEDICAL CENTER | Age: 59
End: 2019-04-19
Attending: FAMILY MEDICINE
Payer: MEDICAID

## 2019-04-19 VITALS
DIASTOLIC BLOOD PRESSURE: 56 MMHG | HEIGHT: 63 IN | BODY MASS INDEX: 40.93 KG/M2 | RESPIRATION RATE: 16 BRPM | HEART RATE: 88 BPM | WEIGHT: 231 LBS | TEMPERATURE: 97.2 F | OXYGEN SATURATION: 93 % | SYSTOLIC BLOOD PRESSURE: 102 MMHG

## 2019-04-19 DIAGNOSIS — S46.001A INJURY OF RIGHT ROTATOR CUFF, INITIAL ENCOUNTER: ICD-10-CM

## 2019-04-19 DIAGNOSIS — K21.9 GASTROESOPHAGEAL REFLUX DISEASE WITHOUT ESOPHAGITIS: ICD-10-CM

## 2019-04-19 PROCEDURE — 99213 OFFICE O/P EST LOW 20 MIN: CPT | Performed by: FAMILY MEDICINE

## 2019-04-19 PROCEDURE — 99214 OFFICE O/P EST MOD 30 MIN: CPT | Performed by: FAMILY MEDICINE

## 2019-04-19 RX ORDER — NAPROXEN 500 MG/1
500 TABLET ORAL 3 TIMES DAILY
Qty: 60 TAB | Refills: 2 | Status: SHIPPED | OUTPATIENT
Start: 2019-04-19

## 2019-04-19 NOTE — PROGRESS NOTES
Subjective:      Norma Jaimes is a 58 y.o. female who presents with Diabetes and Shoulder Pain (R)            HPI 1.  Right upper extremity pain-patient reports that trying to restrain an exuberant large dog back in December which she had on the lease she had a pulling injury to her right arm.  She has had persistent pain at her right wrist and her right shoulder since that time.  She was put in a forearm wrist splint which improves pain only as long as she wears it.  Within 2 days of trying to discontinue the splint pain in her wrist and hand comes back.  She had pre-existing numbness along the ulnar aspect of her hand and now reports numbness in all 5 fingers of her right hand.  Also reports difficulty being able to raise her arm even up to 90 degrees at the shoulder.  Notes pain over the anterior aspect of the right shoulder.  She is left-hand dominant  2.  Diabetes mellitus-patient did increase her Metformin 2000 mg twice daily as instructed about a week and a half ago.  She also has switched to eating 5 or 6 small meals per day and is striving to limit her starch intake.  Patient denies any symptomatic low blood sugars characterized by headache or diaphoresis.  Is testing intermittently currently.  3.  GERD-patient reports ongoing epigastric and substernal pain with frequent ascitic reflux into the back of her throat despite taking ranitidine.  Patient had much better symptom control in the distant past when she lived in [place] and was on some type of a PPI.  ROS negative for black stools, bloody stools, recent fever, polyuria, dysuria  Social history-single, not working  Current medication-  Prior to Admission medications    Medication Sig Start Date End Date Taking? Authorizing Provider   naproxen (NAPROSYN) 500 MG Tab Take 1 Tab by mouth 3 times a day. 4/19/19  Yes Jon Etienne M.D.   esomeprazole (NEXIUM 24HR) 20 MG capsule Take 1 Cap by mouth every morning before breakfast. 4/19/19   Yes Jon Etienne M.D.   ONE TOUCH ULTRA TEST strip 1 STRIP BY OTHER ROUTE 4 TIMES A DAY. ONE TOUCH ULTRA TWO 4/11/19   Jon Etienne M.D.   esomeprazole (HM ESOMEPRAZOLE MAGNESIUM DR) 20 MG capsule Take 1 Cap by mouth every morning before breakfast. 4/11/19   Jon Etienne M.D.   metformin (GLUCOPHAGE) 1000 MG tablet Take 1 Tab by mouth 2 times a day, with meals. 4/5/19   Jon Etienne M.D.   LANTUS 100 UNIT/ML Solution INJECT 80 UNITS AS INSTRUCTED AT BEDTIME AS NEEDED. 4/4/19   Jon Etienne M.D.   esomeprazole (NEXIUM 24HR) 20 MG capsule Take 1 Cap by mouth every morning before breakfast. 4/2/19   Jon Etienne M.D.   tizanidine (ZANAFLEX) 4 MG Tab TAKE 1 TABLET BY MOUTH EVERY 6 HOURS AS NEEDED 4/1/19   Jon Etienne M.D.   AGGRENOX  MG CAPSULE SR 12 HR Take 1 Cap by mouth 2 times a day. 4/1/19   Jon Etienne M.D.   benzonatate (TESSALON) 200 MG capsule Take 1 Cap by mouth 3 times a day as needed for Cough.  Patient not taking: Reported on 4/4/2019 4/1/19   Jon Etienne M.D.   albuterol 108 (90 Base) MCG/ACT Aero Soln inhalation aerosol Inhale 2 Puffs by mouth every 6 hours as needed for Shortness of Breath. 4/1/19   Jon Etienne M.D.   atorvastatin (LIPITOR) 80 MG tablet Take 1 Tab by mouth every evening. 4/1/19   Jon Etienne M.D.   busPIRone (BUSPAR) 30 MG tablet Take 1 Tab by mouth 2 times a day. TAKE 1 TABLET BY MOUTH TWICE A DAY 4/1/19   Jon Etienne M.D.   Cranberry 1000 MG Cap Take 1,000 mg by mouth every day. 4/1/19   Jon Etienne M.D.   furosemide (LASIX) 40 MG Tab Take 1 Tab by mouth every day. 4/1/19   Jon Etienne M.D.   ergocalciferol (DRISDOL) 06062 UNIT capsule Take 1 Cap by mouth every 7 days. 4/1/19   Jon Etienne M.D.   hydrOXYzine HCl (ATARAX) 10 MG Tab Take 1 Tab by mouth 2 times a day as needed for Itching. 4/1/19   Jon Etienne M.D.   insulin glargine (LANTUS) 100 UNIT/ML Solution Inject 80 Units  "as instructed at bedtime as needed. 4/1/19   Jon Etienne M.D.   Insulin Syringe-Needle U-100 (INSULIN SYRINGE .5CC/31GX5/16\") 31G X 5/16\" 0.5 ML Misc To use with long acting and slid scale insulin up to 4x per day 4/1/19   Jon Etienne M.D.   Lancets Lancets covered by insurance, to use 4x a day 4/1/19   Jon Etienne M.D.   topiramate (TOPAMAX) 50 MG tablet Take 1 Tab by mouth 2 times a day. TAKE 1 TABLET BY MOUTH TWICE A DAY 4/1/19   Jon Etienne M.D.   sucralfate (CARAFATE) 1 GM Tab Take 1 Tab by mouth 4 Times a Day,Before Meals and at Bedtime. 4/1/19   Jon Etienne M.D.   quetiapine (SEROQUEL) 400 MG tablet Take 1 Tab by mouth every bedtime. 4/1/19   Jon Etienne M.D.   ranitidine (ZANTAC) 300 MG tablet TAKE 1 TABLET BY MOUTH TWICE A DAY 4/1/19   Jon Etienne M.D.   potassium chloride SA (K-DUR) 10 MEQ Tab CR Take 1 Tab by mouth 2 times a day. TAKE 1 TABLET BY MOUTH TWICE A DAY 4/1/19   Jon Etienne M.D.   pantoprazole (PROTONIX) 20 MG tablet Take 1 Tab by mouth every day.  Patient not taking: Reported on 4/4/2019 4/1/19   Jon Etienne M.D.   ondansetron (ZOFRAN ODT) 4 MG TABLET DISPERSIBLE Take 1 Tab by mouth every 6 hours as needed for Nausea. 4/1/19   Jon Etienne M.D.   mirtazapine (REMERON) 45 MG tablet Take 1 Tab by mouth 1 time daily as needed. 4/1/19   Jon Etienne M.D.   metoprolol (LOPRESSOR) 25 MG Tab Take 1 Tab by mouth every day. 4/1/19   Jon Etienne M.D.   metFORMIN (GLUCOPHAGE) 500 MG Tab Take 1 Tab by mouth 2 times a day, with meals. 4/1/19   Jon Etienne M.D.   cephALEXin (KEFLEX) 500 MG Cap Take 1 Cap by mouth 4 times a day. 3/19/19   Jon Etienne M.D.   levetiracetam (KEPPRA) 1000 MG tablet Take 1.5 Tabs by mouth 2 Times a Day. 1/7/19   Cipriano Camargo   AGGRENOX  MG CAPSULE SR 12 HR Take 1 Cap by mouth 2 times a day. 1/7/19   Cipriano Camargo   insulin lispro (HUMALOG) 100 UNIT/ML Solution Inject 40 " Units as instructed 3 times a day before meals. 1/7/19   Cipriano Camargo   venlafaxine (EFFEXOR-XR) 150 MG extended-release capsule  12/22/18   Physician Outpatient   ONE TOUCH LANCETS Misc Test bld sugar 4x per day w lancets 10/10/18   Cipriano Camargo          Objective:     Vitals: /56, pulse 88, respirations 16, SPO2 93%, weight 231 pounds, height 5 foot 3 inches, BMI 40.93 kg/M2    Physical Exam  Gen.- alert, cooperative, in no acute distress  Neck- midline trachea, thyroid not enlarged or tender,supple, no cervical adenopathy  Chest-clear to auscultation and percussion with normal breath sounds. No retractions. Chest wall nontender  Cardiac- regular rhythm and rate. No murmur, thrill, or heave  Abdomen-Abdomen is soft, nontender, normal bowel sounds, no masses, guarding, or organomegaly.  Right shoulder-abduction and flexion limited to 90 degrees.  1+ tender over the anterior and superior aspect of the right shoulder without palpable crepitus or localized redness  Right lower extremity-decreased touch diffusely over the palm and all 5 fingers.  1-2+ tender over the radial aspect of the distal forearm near the base of the thumb.  No swelling or redness noted.         Assessment/Plan:     1. Injury of right rotator cuff, initial encounter      2. Gastroesophageal reflux disease without esophagitis    3.  Diabetes mellitus  Plan: 1.  Have asked patient to forward twice daily sugars before breakfast and before dinner via my chart in about another 10 days  2.  Continue right wrist splint  3.  Physical therapy referral  4.  Begin Nexium 20 mg daily in place of ranitidine  5.  Rx Naprosyn 500 mg twice daily to be taken with food-GI precautions reviewed

## 2019-04-22 ENCOUNTER — TELEPHONE (OUTPATIENT)
Dept: MEDICAL GROUP | Facility: MEDICAL CENTER | Age: 59
End: 2019-04-22

## 2019-04-22 NOTE — TELEPHONE ENCOUNTER
DOCUMENTATION OF PAR STATUS:    1. Name of Medication & Dose: Nexium     2. Name of Prescription Coverage Company & phone #: Medicaid FFS    3. Date Prior Auth Submitted: 4/22/19    4. What information was given to obtain insurance decision? Office notes, Med Hx, Dx    5. Prior Auth Status? Pending    6. Patient Notified: yes

## 2019-04-23 NOTE — TELEPHONE ENCOUNTER
FINAL PRIOR AUTHORIZATION STATUS:    1.  Name of Medication & Dose: Nexium 24HR     2. Prior Auth Status: Denied.  Reason: Medication Authorization requires the following: You are not on concomitant therapy of sucraldate or an H2 antagonist (e.g, famotidine). (see scanned media)    3. Action Taken: Pharmacy Notified: yes Patient Notified: yes

## 2019-05-14 ENCOUNTER — TELEPHONE (OUTPATIENT)
Dept: MEDICAL GROUP | Facility: MEDICAL CENTER | Age: 59
End: 2019-05-14

## 2019-05-14 NOTE — TELEPHONE ENCOUNTER
DOCUMENTATION OF PAR STATUS:    1. Name of Medication & Dose: PANTOPRAZOLE      2. Name of Prescription Coverage Company & phone #: MEDICAID FFS    3. Date Prior Auth Submitted: 5/14/19    4. What information was given to obtain insurance decision? OFFICE NOTE, MED HX, DX     5. Prior Auth Status? Pending    6. Patient Notified: yes

## 2019-05-16 ENCOUNTER — OFFICE VISIT (OUTPATIENT)
Dept: MEDICAL GROUP | Facility: MEDICAL CENTER | Age: 59
End: 2019-05-16
Attending: FAMILY MEDICINE
Payer: MEDICAID

## 2019-05-16 VITALS
BODY MASS INDEX: 39.09 KG/M2 | TEMPERATURE: 96.8 F | DIASTOLIC BLOOD PRESSURE: 72 MMHG | OXYGEN SATURATION: 95 % | RESPIRATION RATE: 16 BRPM | HEIGHT: 64 IN | WEIGHT: 229 LBS | HEART RATE: 82 BPM | SYSTOLIC BLOOD PRESSURE: 110 MMHG

## 2019-05-16 DIAGNOSIS — F99 PSYCHIATRIC DISORDER: ICD-10-CM

## 2019-05-16 DIAGNOSIS — K21.00 GASTROESOPHAGEAL REFLUX DISEASE WITH ESOPHAGITIS: ICD-10-CM

## 2019-05-16 DIAGNOSIS — M79.7 FIBROMYALGIA: ICD-10-CM

## 2019-05-16 DIAGNOSIS — K21.9 GASTROESOPHAGEAL REFLUX DISEASE WITHOUT ESOPHAGITIS: ICD-10-CM

## 2019-05-16 DIAGNOSIS — I10 ESSENTIAL HYPERTENSION: ICD-10-CM

## 2019-05-16 DIAGNOSIS — R10.9 STOMACH ACHE: ICD-10-CM

## 2019-05-16 PROCEDURE — 99213 OFFICE O/P EST LOW 20 MIN: CPT | Performed by: FAMILY MEDICINE

## 2019-05-16 RX ORDER — INSULIN GLARGINE 100 [IU]/ML
80 INJECTION, SOLUTION SUBCUTANEOUS NIGHTLY PRN
Qty: 20 ML | Refills: 2 | Status: SHIPPED | OUTPATIENT
Start: 2019-05-16

## 2019-05-16 RX ORDER — HYDROXYZINE HYDROCHLORIDE 10 MG/1
10 TABLET, FILM COATED ORAL 2 TIMES DAILY PRN
Qty: 60 TAB | Refills: 3 | Status: SHIPPED | OUTPATIENT
Start: 2019-05-16

## 2019-05-16 RX ORDER — NAPROXEN SODIUM 220 MG
TABLET ORAL
Qty: 150 EACH | Refills: 2 | Status: SHIPPED | OUTPATIENT
Start: 2019-05-16

## 2019-05-16 RX ORDER — FUROSEMIDE 40 MG/1
40 TABLET ORAL
Qty: 90 TAB | Refills: 0 | Status: SHIPPED | OUTPATIENT
Start: 2019-05-16 | End: 2019-09-26 | Stop reason: SDUPTHER

## 2019-05-16 RX ORDER — SUCRALFATE 1 G/1
1 TABLET ORAL
Qty: 60 TAB | Refills: 1 | Status: SHIPPED | OUTPATIENT
Start: 2019-05-16

## 2019-05-16 RX ORDER — TOPIRAMATE 50 MG/1
50 TABLET, FILM COATED ORAL 2 TIMES DAILY
Qty: 60 TAB | Refills: 2 | Status: SHIPPED | OUTPATIENT
Start: 2019-05-16

## 2019-05-16 NOTE — PROGRESS NOTES
"Subjective:      Norma Jaimes is a 58 y.o. female who presents with Diabetes            HPI 1.  Uncontrolled type 2 diabetes mellitus-patient reports that when she was taking at thousand milligrams of metformin twice daily along with 50 units of Lantus at bedtime that her morning sugars were consistently in the 50s and she was very symptomatic with lightheadedness and diaphoresis.  She switched that about 1 week ago to 500 mg of metformin in the morning and thousand milligrams at bedtime still continuing the 50 units of Lantus reports morning sugars are now 90 and quite comfortable.  Evening sugars before dinner however remain high about 200.  Patient reports in the past when she took Lantus in the morning 50 units, that she tended to have symptomatic hypoglycemia by noontime.  Current weight is down 2 pounds.  2.  GERD-patient reports that she still continue to take ranitidine out of lack of any other alternative.  Apparently her prior authorization for Nexium was denied and a seconds prior off for pantoprazole is been sent for unclear reasons.  We will check on the status of getting her a PPI.  She still continues to report substernal burning with the waterbrash all the way to the back of her mouth.    ROS negative for oliguria, unexplained fever, abdominal pain       Objective:     /72 (BP Location: Left arm, Patient Position: Sitting, BP Cuff Size: Adult long)   Pulse 82   Temp 36 °C (96.8 °F) (Temporal)   Resp 16   Ht 1.613 m (5' 3.5\")   Wt 103.9 kg (229 lb)   LMP  (LMP Unknown)   SpO2 95%   Breastfeeding? No   BMI 39.93 kg/m²      Physical Exam  Gen.- alert, cooperative, in no acute distress  Neck- midline trachea, thyroid not enlarged or tender,supple, no cervical adenopathy  Chest-clear to auscultation and percussion with normal breath sounds. No retractions. Chest wall nontender  Cardiac- regular rhythm and rate. No murmur, thrill, or heave  Abdomen- normal bowel sounds, soft " without guarding. Liver and spleen not enlarged, no palpable masses or tenderness.          Assessment/Plan:     1. Uncontrolled type 2 diabetes mellitus with kidney complication, with long-term current use of insulin (HCC)      2. Gastroesophageal reflux disease without esophagitis    Plan: 1.  We will continue 500 mg of metformin thousand milligrams at night  2.  Patient is asked to hold her large doses of sliding scale Humalog but continue Lantus  3.  Prescriptions renewed-patient anticipates moving to [place] in approximately 1 month

## 2019-06-06 ENCOUNTER — SLEEP CENTER VISIT (OUTPATIENT)
Dept: SLEEP MEDICINE | Facility: MEDICAL CENTER | Age: 59
End: 2019-06-06
Payer: MEDICAID

## 2019-06-06 VITALS
SYSTOLIC BLOOD PRESSURE: 118 MMHG | HEIGHT: 63 IN | OXYGEN SATURATION: 94 % | RESPIRATION RATE: 16 BRPM | WEIGHT: 225 LBS | HEART RATE: 75 BPM | DIASTOLIC BLOOD PRESSURE: 61 MMHG | BODY MASS INDEX: 39.87 KG/M2

## 2019-06-06 DIAGNOSIS — G47.33 OBSTRUCTIVE SLEEP APNEA SYNDROME: ICD-10-CM

## 2019-06-06 PROCEDURE — 99213 OFFICE O/P EST LOW 20 MIN: CPT | Performed by: NURSE PRACTITIONER

## 2019-06-06 NOTE — PATIENT INSTRUCTIONS
1) Start new CPAP machine at [id number] - placed STAT   2) Clean mask and supplies weekly and change them as insurance allows  3) Light conditioning encouraged  4) Encourage  smoking cessation   5) Follow up with pain specialist  6) Vaccines: Recommended   7) Return in about 2 months (around 8/6/2019) for if not sooner, review of symptoms, Compliance, follow up with THUY Abreu.

## 2019-06-06 NOTE — PROGRESS NOTES
"CC:  Here for f/u sleep issues as listed below    HPI:   Norma presents today for follow up obstructive sleep apnea.  Past medical history of hypertension, fatigue, chronic headache, type 2 diabetes, chronic kidney disease stage III.  Tobacco abuse.  She is a every day smoker 74-pack-year history, smoking 2 packs/day.  She is not interested in quitting but understands the benefits of complete smoking cessation. She is under a lot of stress recently her son has been diagnosed with Hodgkin's lymphoma and not doing well. She is emotional today understandably, but often closes her eyes while she speaks or gazes about. She is not driving.      A sleep test in 2011 demonstrated an apnea hypopnea index of 57 events per hour with a lowest arterial oxygen saturation of 81%.  PSG from 2016 demonstrated an apnea hypopnea index of 15.9 events per hour with no REM sleep time recorded, and lowest arterial oxygen saturation was 83%.  She required split-night titration study that was completed 3-2019 per insurance to obtain new machine.  Her old one is extremely hot, causes her lungs to \"burn\", and causes her to wake up.    She is currently using a rental CPAP machine.  She has not obtained her new CPAP machine.     Currently she is being treated with CPAP @ [id number].  She wears the machine 8 hours a night with the machine. She denies naps.  She Has chronic pain that is uncontrolled and following a pain specialist.   She takes sleeping aids every night including Seroquel.       Patient Active Problem List    Diagnosis Date Noted   • BMI 39.0-39.9,adult 06/06/2019   • Gastroesophageal reflux disease without esophagitis 04/19/2019   • Cutaneous abscess of right axilla 03/19/2019   • Essential hypertension 01/31/2019   • Headache, unspecified headache type 01/07/2019   • Stomach ache 01/07/2019   • Right wrist pain 01/07/2019   • Vaginal cyst 11/14/2018   • Cough 11/14/2018   • Vitamin D deficiency 10/10/2018   • Assistance needed with " transportation 10/10/2018   • Microalbuminuria due to type 2 diabetes mellitus ([facility]) 10/04/2018   • Neuropathy ([facility]) 09/27/2018   • Fatigue 09/27/2018   • Pain in both feet 09/11/2018   • Poor vision 09/11/2018   • Skin infection 09/11/2018   • Medication management 08/08/2018   • Uncontrolled type 2 diabetes mellitus with kidney complication, with long-term current use of insulin ([facility]) 08/08/2018   • Colon polyps 08/08/2018   • Mixed hyperlipidemia 08/08/2018   • Tobacco abuse 08/08/2018   • Psychiatric disorder 08/08/2018   • Hot flashes 08/08/2018   • Obstructive sleep apnea syndrome 08/08/2018   • Stage 3 chronic kidney disease ([facility]) 08/08/2018   • CAD, multiple vessel 08/08/2018   • Chronic pain syndrome 08/08/2018       Past Medical History:   Diagnosis Date   • Anxiety    • Asthma    • Back pain    • Bipolar affective disorder ([facility])    • Bronchitis    • CHF (congestive heart failure) ([facility])    • Chickenpox    • CKD (chronic kidney disease) stage 3, GFR 30-59 ml/min ([facility])    • Colon polyp    • COPD (chronic obstructive pulmonary disease) ([facility])    • Depression    • Diabetes ([facility])    • Heart attack ([facility])    • Hyperlipidemia    • Kidney disease    • Kidney stone    • Muscle disorder    • Obesity    • Pneumonia    • Pulmonary embolism ([facility])    • Seizure ([facility])    • Sleep apnea        Past Surgical History:   Procedure Laterality Date   • ABDOMINAL HYSTERECTOMY TOTAL     • APPENDECTOMY     • CARPAL TUNNEL RELEASE     • COLONOSCOPY      history of colon    • HYSTERECTOMY LAPAROSCOPY     • INTUBATION     • SINUSCOPE     • VENTILATOR CONTINUOUS         Family History   Problem Relation Age of Onset   • Heart Disease Mother    • Hypertension Mother    • Diabetes Father    • Cancer Maternal Grandfather    • Diabetes Paternal Grandmother    • Diabetes Paternal Grandfather    • Lung Disease Sister    • Cancer Son        Social History     Social History   • Marital status:      Spouse name: N/A   • Number of  "children: N/A   • Years of education: N/A     Occupational History   • Not on file.     Social History Main Topics   • Smoking status: Current Every Day Smoker     Packs/day: 2.00     Years: 37.00     Types: Cigarettes   • Smokeless tobacco: Never Used      Comment: started smoking at age 22    • Alcohol use No   • Drug use: No   • Sexual activity: Not Currently     Other Topics Concern   • Not on file     Social History Narrative   • No narrative on file       Current Outpatient Prescriptions   Medication Sig Dispense Refill   • levetiracetam (KEPPRA) 1000 MG tablet TAKE 1.5 TABS BY MOUTH 2 TIMES A DAY. 90 Tab 6   • furosemide (LASIX) 40 MG Tab Take 1 Tab by mouth every day. 90 Tab 0   • hydrOXYzine HCl (ATARAX) 10 MG Tab Take 1 Tab by mouth 2 times a day as needed for Itching. 60 Tab 3   • insulin glargine (LANTUS) 100 UNIT/ML Solution Inject 80 Units as instructed at bedtime as needed. 20 mL 2   • Insulin Syringe-Needle U-100 (INSULIN SYRINGE .5CC/31GX5/16\") 31G X 5/16\" 0.5 ML Misc To use with long acting and slid scale insulin up to 4x per day 150 Each 2   • topiramate (TOPAMAX) 50 MG tablet Take 1 Tab by mouth 2 times a day. TAKE 1 TABLET BY MOUTH TWICE A DAY 60 Tab 2   • sucralfate (CARAFATE) 1 GM Tab Take 1 Tab by mouth 4 Times a Day,Before Meals and at Bedtime. 60 Tab 1   • mirtazapine (REMERON) 45 MG tablet TAKE 1 TAB BY MOUTH 1 TIME DAILY AS NEEDED. 30 Tab 3   • naproxen (NAPROSYN) 500 MG Tab Take 1 Tab by mouth 3 times a day. 60 Tab 2   • esomeprazole (NEXIUM 24HR) 20 MG capsule Take 1 Cap by mouth every morning before breakfast. 30 Cap 5   • ONE TOUCH ULTRA TEST strip 1 STRIP BY OTHER ROUTE 4 TIMES A DAY. ONE TOUCH ULTRA  Strip 5   • esomeprazole (HM ESOMEPRAZOLE MAGNESIUM DR) 20 MG capsule Take 1 Cap by mouth every morning before breakfast. 30 Cap 6   • metformin (GLUCOPHAGE) 1000 MG tablet Take 1 Tab by mouth 2 times a day, with meals. 60 Tab 11   • LANTUS 100 UNIT/ML Solution INJECT 80 UNITS AS " INSTRUCTED AT BEDTIME AS NEEDED. 20 mL 6   • esomeprazole (NEXIUM 24HR) 20 MG capsule Take 1 Cap by mouth every morning before breakfast. 30 Cap 5   • tizanidine (ZANAFLEX) 4 MG Tab TAKE 1 TABLET BY MOUTH EVERY 6 HOURS AS NEEDED 120 Tab 2   • AGGRENOX  MG CAPSULE SR 12 HR Take 1 Cap by mouth 2 times a day. 60 Cap 2   • benzonatate (TESSALON) 200 MG capsule Take 1 Cap by mouth 3 times a day as needed for Cough. (Patient not taking: Reported on 4/4/2019) 20 Cap 0   • albuterol 108 (90 Base) MCG/ACT Aero Soln inhalation aerosol Inhale 2 Puffs by mouth every 6 hours as needed for Shortness of Breath. 8.5 g 3   • atorvastatin (LIPITOR) 80 MG tablet Take 1 Tab by mouth every evening. 30 Tab 11   • busPIRone (BUSPAR) 30 MG tablet Take 1 Tab by mouth 2 times a day. TAKE 1 TABLET BY MOUTH TWICE A DAY 30 Tab 3   • Cranberry 1000 MG Cap Take 1,000 mg by mouth every day. 30 Cap 3   • ergocalciferol (DRISDOL) 95138 UNIT capsule Take 1 Cap by mouth every 7 days. 4 Cap 11   • Lancets Lancets covered by insurance, to use 4x a day 200 Each 2   • quetiapine (SEROQUEL) 400 MG tablet Take 1 Tab by mouth every bedtime. 30 Tab 1   • ranitidine (ZANTAC) 300 MG tablet TAKE 1 TABLET BY MOUTH TWICE A DAY 60 Tab 3   • potassium chloride SA (K-DUR) 10 MEQ Tab CR Take 1 Tab by mouth 2 times a day. TAKE 1 TABLET BY MOUTH TWICE A DAY 60 Tab 2   • pantoprazole (PROTONIX) 20 MG tablet Take 1 Tab by mouth every day. (Patient not taking: Reported on 4/4/2019) 30 Tab 2   • ondansetron (ZOFRAN ODT) 4 MG TABLET DISPERSIBLE Take 1 Tab by mouth every 6 hours as needed for Nausea. 20 Tab 1   • metoprolol (LOPRESSOR) 25 MG Tab Take 1 Tab by mouth every day. 90 Tab 0   • metFORMIN (GLUCOPHAGE) 500 MG Tab Take 1 Tab by mouth 2 times a day, with meals. 180 Tab 0   • cephALEXin (KEFLEX) 500 MG Cap Take 1 Cap by mouth 4 times a day. 30 Cap 0   • AGGRENOX  MG CAPSULE SR 12 HR Take 1 Cap by mouth 2 times a day. 60 Cap 2   • insulin lispro (HUMALOG) 100  "UNIT/ML Solution Inject 40 Units as instructed 3 times a day before meals. 20 mL 2   • venlafaxine (EFFEXOR-XR) 150 MG extended-release capsule      • ONE TOUCH LANCETS Misc Test bld sugar 4x per day w lancets 200 Each 2     No current facility-administered medications for this visit.           Allergies: Imitrex [sumatriptan]; Baclofen; Ees [erythromycin]; Erythromycin [akne-mycin]; and Morphine      ROS   Gen: Denies fever, chills, unintentional weight loss  Resp:Denies Dyspnea  CV: Denies chest pain, chest tightness  Sleep:Denies morning headache, insomnia,  snoring, gasping for air, apnea  Neuro: Denies frequent headaches, weakness, dizziness  See HPI.  All other systems reviewed and negative        Vital signs for this encounter:  Vitals:    06/06/19 1406   Height: 1.6 m (5' 3\")   Weight: 102.1 kg (225 lb)   Weight % change since last entry.: 0 %   BP: 118/61   Pulse: 75   BMI (Calculated): 39.86   Resp: 16                   Physical Exam:   Gen:         Alert and oriented, No apparent distress.   Neck:        No Lymphadenopathy.  Lungs:     Clear to auscultation bilaterally.    CV:          Regular rate and rhythm. No murmurs, rubs or gallops.   Abd:         Soft non tender, non distended.            Ext:          No clubbing, cyanosis, edema.    Assessment   1. Obstructive sleep apnea syndrome     2. BMI 39.0-39.9,adult  OBESITY COUNSELING (No Charge): Patient identified as having weight management issue.  Appropriate orders and counseling given.       Patient is clinically stable and will proceed with following plan.     PLAN:   Patient Instructions   1) Start new CPAP machine at [id number] - placed STAT   2) Clean mask and supplies weekly and change them as insurance allows  3) Light conditioning encouraged  4) Encourage  smoking cessation   5) Follow up with pain specialist  6) Vaccines: Recommended   7) Return in about 2 months (around 8/6/2019) for if not sooner, review of symptoms, Compliance, follow up " with THUY Abreu.

## 2019-06-24 DIAGNOSIS — G89.4 CHRONIC PAIN SYNDROME: ICD-10-CM

## 2019-06-24 RX ORDER — TIZANIDINE 4 MG/1
TABLET ORAL
Qty: 120 TAB | Refills: 2 | Status: SHIPPED | OUTPATIENT
Start: 2019-06-24

## 2019-09-25 DIAGNOSIS — I10 ESSENTIAL HYPERTENSION: ICD-10-CM

## 2019-09-26 RX ORDER — FUROSEMIDE 40 MG/1
40 TABLET ORAL
Qty: 90 TAB | Refills: 1 | Status: SHIPPED | OUTPATIENT
Start: 2019-09-26

## 2021-03-15 DIAGNOSIS — Z23 NEED FOR VACCINATION: ICD-10-CM

## 2024-08-29 NOTE — TELEPHONE ENCOUNTER
1. Caller Name: Norma Jaimes                                           Call Back Number: [phone] (home)         Patient approves a detailed voicemail message: yes    Pt called and had a question about her medications. Is pt supposed to be still be taking Methocarbamol. Please advise.   Detail Level: Detailed